# Patient Record
Sex: FEMALE | Race: BLACK OR AFRICAN AMERICAN | NOT HISPANIC OR LATINO | Employment: UNEMPLOYED | URBAN - METROPOLITAN AREA
[De-identification: names, ages, dates, MRNs, and addresses within clinical notes are randomized per-mention and may not be internally consistent; named-entity substitution may affect disease eponyms.]

---

## 2018-01-17 ENCOUNTER — ALLSCRIPTS OFFICE VISIT (OUTPATIENT)
Dept: OTHER | Facility: OTHER | Age: 48
End: 2018-01-17

## 2018-01-17 DIAGNOSIS — R25.1 TREMOR: ICD-10-CM

## 2018-01-17 DIAGNOSIS — M54.50 LOW BACK PAIN: ICD-10-CM

## 2018-01-18 ENCOUNTER — TRANSCRIBE ORDERS (OUTPATIENT)
Dept: ADMINISTRATIVE | Facility: HOSPITAL | Age: 48
End: 2018-01-18

## 2018-01-18 DIAGNOSIS — R25.1 DYSPHONIA OF ORGANIC TREMOR: Primary | ICD-10-CM

## 2018-01-18 DIAGNOSIS — M54.40 LOW BACK PAIN WITH SCIATICA, SCIATICA LATERALITY UNSPECIFIED, UNSPECIFIED BACK PAIN LATERALITY, UNSPECIFIED CHRONICITY: ICD-10-CM

## 2018-01-18 DIAGNOSIS — R49.0 DYSPHONIA OF ORGANIC TREMOR: Primary | ICD-10-CM

## 2018-01-23 VITALS
BODY MASS INDEX: 34.93 KG/M2 | HEIGHT: 70 IN | SYSTOLIC BLOOD PRESSURE: 136 MMHG | DIASTOLIC BLOOD PRESSURE: 98 MMHG | WEIGHT: 244 LBS | HEART RATE: 90 BPM | RESPIRATION RATE: 16 BRPM

## 2018-01-29 ENCOUNTER — HOSPITAL ENCOUNTER (OUTPATIENT)
Dept: MRI IMAGING | Facility: HOSPITAL | Age: 48
Discharge: HOME | End: 2018-01-29
Attending: PSYCHIATRY & NEUROLOGY
Payer: COMMERCIAL

## 2018-01-29 DIAGNOSIS — R49.0 DYSPHONIA OF ORGANIC TREMOR: ICD-10-CM

## 2018-01-29 DIAGNOSIS — R25.1 DYSPHONIA OF ORGANIC TREMOR: ICD-10-CM

## 2018-01-29 DIAGNOSIS — M54.40 LOW BACK PAIN WITH SCIATICA, SCIATICA LATERALITY UNSPECIFIED, UNSPECIFIED BACK PAIN LATERALITY, UNSPECIFIED CHRONICITY: ICD-10-CM

## 2018-01-29 PROCEDURE — 70551 MRI BRAIN STEM W/O DYE: CPT

## 2018-01-29 PROCEDURE — 72148 MRI LUMBAR SPINE W/O DYE: CPT

## 2018-01-30 ENCOUNTER — TELEPHONE (OUTPATIENT)
Dept: NEUROLOGY | Facility: CLINIC | Age: 48
End: 2018-01-30

## 2018-01-30 NOTE — TELEPHONE ENCOUNTER
Discussed with patient results and advised patient to follow up with her gynecologist regarding abnormal findings

## 2018-01-30 NOTE — TELEPHONE ENCOUNTER
Radiology Department called and would like you to view patients results because there is significant findings

## 2018-01-30 NOTE — TELEPHONE ENCOUNTER
Left message for the patient both at her home and cell phone number to call us back to discuss the MRI scan result

## 2018-01-30 NOTE — TELEPHONE ENCOUNTER
Discussed with patient MRI scan of the lumbar spine result advised the patient to follow up with her gynecologist regarding abnormal findings she will  the MRI scan report from the office

## 2018-04-19 RX ORDER — ZOLPIDEM TARTRATE 10 MG/1
1 TABLET ORAL
COMMUNITY

## 2018-04-19 RX ORDER — BUSPIRONE HYDROCHLORIDE 30 MG/1
1 TABLET ORAL 2 TIMES DAILY
COMMUNITY

## 2018-04-19 RX ORDER — DULOXETIN HYDROCHLORIDE 60 MG/1
1 CAPSULE, DELAYED RELEASE ORAL 2 TIMES DAILY
COMMUNITY

## 2018-04-19 RX ORDER — OMEPRAZOLE 20 MG/1
1 CAPSULE, DELAYED RELEASE ORAL DAILY
COMMUNITY
End: 2019-04-30 | Stop reason: ALTCHOICE

## 2018-04-19 RX ORDER — GABAPENTIN 300 MG/1
1 CAPSULE ORAL 3 TIMES DAILY
COMMUNITY
End: 2019-09-05 | Stop reason: ALTCHOICE

## 2018-04-19 RX ORDER — OMEGA-3 FATTY ACIDS CAP DELAYED RELEASE 1000 MG 1000 MG
CAPSULE DELAYED RELEASE ORAL
COMMUNITY

## 2018-04-19 RX ORDER — MULTIVITAMIN
CAPSULE ORAL
COMMUNITY

## 2018-04-19 RX ORDER — MONTELUKAST SODIUM 10 MG/1
1 TABLET ORAL
COMMUNITY

## 2018-04-19 RX ORDER — MIRTAZAPINE 30 MG/1
30 TABLET, FILM COATED ORAL 2 TIMES DAILY
COMMUNITY
End: 2019-09-05 | Stop reason: ALTCHOICE

## 2018-04-19 RX ORDER — IBUPROFEN 800 MG/1
1 TABLET ORAL DAILY PRN
COMMUNITY
End: 2021-07-27 | Stop reason: ALTCHOICE

## 2018-04-19 RX ORDER — FEXOFENADINE HCL 180 MG/1
1 TABLET ORAL DAILY PRN
COMMUNITY
End: 2019-09-05 | Stop reason: ALTCHOICE

## 2018-04-19 RX ORDER — PNV NO.95/FERROUS FUM/FOLIC AC 28MG-0.8MG
1 TABLET ORAL DAILY
COMMUNITY

## 2018-04-19 RX ORDER — ERGOCALCIFEROL 1.25 MG/1
CAPSULE ORAL 2 TIMES WEEKLY
COMMUNITY
Start: 2018-01-21

## 2018-04-19 RX ORDER — NAPROXEN 500 MG/1
TABLET ORAL 2 TIMES DAILY PRN
COMMUNITY
Start: 2017-11-01 | End: 2019-04-30 | Stop reason: ALTCHOICE

## 2018-04-24 ENCOUNTER — OFFICE VISIT (OUTPATIENT)
Dept: NEUROLOGY | Facility: CLINIC | Age: 48
End: 2018-04-24
Payer: COMMERCIAL

## 2018-04-24 VITALS
WEIGHT: 251 LBS | SYSTOLIC BLOOD PRESSURE: 132 MMHG | HEART RATE: 88 BPM | DIASTOLIC BLOOD PRESSURE: 103 MMHG | HEIGHT: 70 IN | BODY MASS INDEX: 35.93 KG/M2

## 2018-04-24 DIAGNOSIS — R25.1 TREMOR: Primary | ICD-10-CM

## 2018-04-24 PROCEDURE — 99213 OFFICE O/P EST LOW 20 MIN: CPT | Performed by: PSYCHIATRY & NEUROLOGY

## 2018-04-24 RX ORDER — KETOCONAZOLE 20 MG/ML
SHAMPOO TOPICAL AS NEEDED
COMMUNITY
Start: 2018-03-19

## 2018-04-24 RX ORDER — ALBUTEROL SULFATE 90 UG/1
1 AEROSOL, METERED RESPIRATORY (INHALATION) EVERY 6 HOURS
COMMUNITY
Start: 2018-03-26 | End: 2019-03-26

## 2018-04-24 RX ORDER — KETOCONAZOLE 20 MG/G
CREAM TOPICAL AS NEEDED
COMMUNITY
Start: 2018-03-19

## 2018-04-24 RX ORDER — PREDNISONE 10 MG/1
TABLET ORAL
COMMUNITY
Start: 2018-03-26 | End: 2018-07-25 | Stop reason: ALTCHOICE

## 2018-04-24 NOTE — PROGRESS NOTES
Tre Anderson is a 51 y o  female  Chief Complaint   Patient presents with    Tremors     hands, head and legs       Assessment:  1  Tremor        Plan:    Discussion:  Differential diagnosis of tremor discussed with the patient most likely secondary to anxiety, currently patient does not have any tremor, she does not have any features of Parkinson's disease and tells me that her tremor is mostly when she is more anxious or under stress, she was advised to follow up with her psychiatrist and family physician regarding her anxiety, MRI scan of the brain and lumbar spine results were reviewed with the patient, she has going to get us the results of her blood work that she recently had with her family physician, she was advised to follow up with her gynecologist regarding her fibroids and with her other physicians and her family physician regarding her blood pressure and other issues and see me back in 3-4 months  Subjective:    HPI   Patient is here in follow-up for tremor, since her last visit her tremor seems to be under control except when she is under stress or anxious, she denies freezing episodes, there is no resting or intentional tremor, no difficulty in swallowing, she has history of fibromyalgia and is in follow up with her psychiatrist and family physician, no falls  No other complaints      Vitals:    04/24/18 1504   BP: (!) 132/103   BP Location: Left arm   Patient Position: Sitting   Cuff Size: Large   Pulse: 88   Weight: 114 kg (251 lb)   Height: 5' 10" (1 778 m)       Current Medications    Current Outpatient Prescriptions:     albuterol (PROVENTIL HFA,VENTOLIN HFA) 90 mcg/act inhaler, Inhale 1 puff every 6 (six) hours, Disp: , Rfl:     busPIRone (BUSPAR) 30 MG tablet, Take 1 tablet by mouth daily, Disp: , Rfl:     DULoxetine (CYMBALTA) 60 mg delayed release capsule, Take 1 capsule by mouth 2 (two) times a day, Disp: , Rfl:     ergocalciferol (VITAMIN D2) 50,000 units, 2 (two) times a week, Disp: , Rfl:     Ferrous Sulfate (IRON) 325 (65 Fe) MG TABS, Take 1 tablet by mouth daily, Disp: , Rfl:     fexofenadine (ALLEGRA) 180 MG tablet, Take 1 tablet by mouth daily as needed, Disp: , Rfl:     ibuprofen (MOTRIN) 800 mg tablet, Take 1 tablet by mouth daily as needed, Disp: , Rfl:     ketoconazole (NIZORAL) 2 % cream, , Disp: , Rfl:     ketoconazole (NIZORAL) 2 % shampoo, , Disp: , Rfl:     LYRICA 150 MG capsule, 2 (two) times a day, Disp: , Rfl:     mirtazapine (REMERON) 15 mg tablet, Take 30 mg by mouth daily at bedtime  , Disp: , Rfl:     montelukast (SINGULAIR) 10 mg tablet, Take 1 tablet by mouth daily as needed, Disp: , Rfl:     Multiple Vitamin (MULTIVITAMIN) capsule, Take by mouth, Disp: , Rfl:     naproxen (NAPROSYN) 500 mg tablet, 2 (two) times a day as needed  , Disp: , Rfl:     Omega-3 Fatty Acids (FISH OIL) 645 MG CAPS, Take by mouth, Disp: , Rfl:     omeprazole (PriLOSEC) 20 mg delayed release capsule, Take 1 capsule by mouth daily, Disp: , Rfl:     predniSONE 10 mg tablet, , Disp: , Rfl:     zolpidem (AMBIEN) 10 mg tablet, Take 1 tablet by mouth daily at bedtime as needed, Disp: , Rfl:     gabapentin (NEURONTIN) 300 mg capsule, Take 1 capsule by mouth 3 (three) times a day, Disp: , Rfl:       Allergies  Shellfish allergy; Cinnamon; and Latex    Past Medical History  Past Medical History:   Diagnosis Date    Breast cancer ([facility] [address] ) 2014    Eardrum rupture, left     Fibromyalgia, primary     Seasonal allergies          Past Surgical History:  Past Surgical History:   Procedure Laterality Date    BREAST LUMPECTOMY  2015    MYOMECTOMY           Family History:  Family History   Problem Relation Age of Onset    Parkinsonism Mother     Rheum arthritis Mother     Osteoarthritis Mother     Heart attack Mother     Diabetes Mother     Depression Mother     Hypertension Father     Heart murmur Father     Depression Father     Diabetes Sister     Diabetes Brother     Tremor Maternal Grandmother        Social History:       I have reviewed the past medical history, surgical history, social and family history, current medications, allergies vitals, review of systems, and updated this information as appropriate today  Objective:    Physical Exam    Neurological Exam      GENERAL:  Cooperative in no acute distress  Well-developed and well-nourished    HEAD and NECK   Head is atraumatic normocephalic with no lesions or masses  Neck is supple with full range of motion    CARDIOVASCULAR  Carotid Arteries-no carotid bruits  NEUROLOGIC:  Mental Status-the patient is awake alert and oriented without aphasia or apraxia  Cranial Nerves: Visual fields are full to confrontation  Extraocular movements are full without nystagmus  Pupils are 2-1/2 mm and reactive  Face is symmetrical to light touch  Movements of facial expression move symmetrically  Hearing is normal to finger rub bilaterally  Soft palate lifts symmetrically  Shoulder shrug is symmetrical  Tongue is midline without atrophy  Motor: No drift is noted on arm extension  Strength is full in the upper and lower extremities with normal bulk and tone  Sensory: Intact to temperature and vibratory sensation in the upper and lower extremities bilaterally  Cortical function is intact  Coordination: Finger to nose testing is performed accurately  Romberg is negative  Gait reveals a normal base with symmetrical arm swing  ROS:  Review of Systems   Constitutional: Positive for appetite change and fatigue  HENT: Positive for hearing loss  Negative for drooling, facial swelling and trouble swallowing  Eyes: Negative for photophobia, pain and visual disturbance  Respiratory: Positive for chest tightness and shortness of breath  Cardiovascular: Negative for chest pain  Gastrointestinal: Negative for abdominal pain, constipation, nausea and vomiting  Endocrine: Negative for polydipsia     Genitourinary: Positive for frequency  Negative for difficulty urinating, enuresis and flank pain  Musculoskeletal: Positive for arthralgias, back pain, joint swelling and neck pain  Skin: Negative for rash and wound  Neurological: Positive for dizziness, tremors, weakness, numbness and headaches  Negative for seizures  Psychiatric/Behavioral: Positive for confusion, decreased concentration, dysphoric mood, sleep disturbance and suicidal ideas  Negative for behavioral problems

## 2018-07-25 ENCOUNTER — OFFICE VISIT (OUTPATIENT)
Dept: NEUROLOGY | Facility: CLINIC | Age: 48
End: 2018-07-25
Payer: COMMERCIAL

## 2018-07-25 VITALS
HEIGHT: 68 IN | HEART RATE: 80 BPM | WEIGHT: 248 LBS | SYSTOLIC BLOOD PRESSURE: 124 MMHG | BODY MASS INDEX: 37.59 KG/M2 | DIASTOLIC BLOOD PRESSURE: 100 MMHG

## 2018-07-25 DIAGNOSIS — R25.1 TREMOR: Primary | ICD-10-CM

## 2018-07-25 PROCEDURE — 99213 OFFICE O/P EST LOW 20 MIN: CPT | Performed by: PSYCHIATRY & NEUROLOGY

## 2018-07-25 RX ORDER — HYDROXYZINE 50 MG/1
50 TABLET, FILM COATED ORAL 2 TIMES DAILY
COMMUNITY
End: 2019-09-05 | Stop reason: SDUPTHER

## 2018-07-25 NOTE — PROGRESS NOTES
Benny Rasheed is a 51 y o  female  Chief Complaint   Patient presents with    Tremors       Assessment:  1  Tremor        Plan:    Discussion:  Patient currently does not have any tremor, overall she is doing good, she was advised to continue follow up with her other specialist, to go to the hospital if has any worsening symptoms and call me, follow up with her other physicians and see me back in 6 months  Subjective:    HPI   Patient is here in follow-up for tremor, since her last visit she is doing good, she does not have any tremor, she had a flare-up of her fibromyalgia and is on increased dose of Lyrica, she denies any other complaints      Vitals:    07/25/18 1339   BP: 124/100   BP Location: Left arm   Patient Position: Sitting   Cuff Size: Large   Pulse: 80   Weight: 112 kg (248 lb)   Height: 5' 8" (1 727 m)       Current Medications    Current Outpatient Prescriptions:     albuterol (PROVENTIL HFA,VENTOLIN HFA) 90 mcg/act inhaler, Inhale 1 puff every 6 (six) hours, Disp: , Rfl:     busPIRone (BUSPAR) 30 MG tablet, Take 1 tablet by mouth 2 (two) times a day  , Disp: , Rfl:     DULoxetine (CYMBALTA) 60 mg delayed release capsule, Take 1 capsule by mouth 2 (two) times a day, Disp: , Rfl:     ergocalciferol (VITAMIN D2) 50,000 units, 2 (two) times a week, Disp: , Rfl:     Ferrous Sulfate (IRON) 325 (65 Fe) MG TABS, Take 1 tablet by mouth daily, Disp: , Rfl:     fexofenadine (ALLEGRA) 180 MG tablet, Take 1 tablet by mouth daily as needed, Disp: , Rfl:     gabapentin (NEURONTIN) 300 mg capsule, Take 1 capsule by mouth 3 (three) times a day, Disp: , Rfl:     hydrOXYzine HCL (ATARAX) 50 mg tablet, Take 50 mg by mouth 2 (two) times a day, Disp: , Rfl:     ibuprofen (MOTRIN) 800 mg tablet, Take 1 tablet by mouth daily as needed, Disp: , Rfl:     ketoconazole (NIZORAL) 2 % cream, , Disp: , Rfl:     ketoconazole (NIZORAL) 2 % shampoo, , Disp: , Rfl:     LYRICA 300 MG capsule, 2 (two) times a day, Disp: , Rfl:     mirtazapine (REMERON) 30 mg tablet, Take 30 mg by mouth 2 (two) times a day  , Disp: , Rfl:     montelukast (SINGULAIR) 10 mg tablet, Take 1 tablet by mouth daily as needed, Disp: , Rfl:     Multiple Vitamin (MULTIVITAMIN) capsule, Take by mouth, Disp: , Rfl:     naproxen (NAPROSYN) 500 mg tablet, 2 (two) times a day as needed  , Disp: , Rfl:     Omega-3 Fatty Acids (FISH OIL) 645 MG CAPS, Take by mouth, Disp: , Rfl:     omeprazole (PriLOSEC) 20 mg delayed release capsule, Take 1 capsule by mouth daily, Disp: , Rfl:     zolpidem (AMBIEN) 10 mg tablet, Take 1 tablet by mouth daily at bedtime as needed, Disp: , Rfl:       Allergies  Shellfish allergy; Cinnamon; and Latex    Past Medical History  Past Medical History:   Diagnosis Date    Breast cancer ([facility] [address] ) 2014    Eardrum rupture, left     Fibromyalgia, primary     Seasonal allergies          Past Surgical History:  Past Surgical History:   Procedure Laterality Date    BREAST LUMPECTOMY  2015    MYOMECTOMY           Family History:  Family History   Problem Relation Age of Onset    Parkinsonism Mother     Rheum arthritis Mother     Osteoarthritis Mother     Heart attack Mother     Diabetes Mother     Depression Mother     Hypertension Father     Heart murmur Father     Depression Father     Diabetes Sister     Diabetes Brother     Tremor Maternal Grandmother        Social History:   reports that she has never smoked  She has never used smokeless tobacco  She reports that she does not drink alcohol or use drugs  I have reviewed the past medical history, surgical history, social and family history, current medications, allergies vitals, review of systems, and updated this information as appropriate today  Objective:    Physical Exam    Neurological Exam    GENERAL:  Cooperative in no acute distress  Well-developed and well-nourished    HEAD and NECK   Head is atraumatic normocephalic with no lesions or masses   Neck is supple with full range of motion    CARDIOVASCULAR  Carotid Arteries-no carotid bruits  NEUROLOGIC:  Mental Status-the patient is awake alert and oriented without aphasia or apraxia  Cranial Nerves: Visual fields are full to confrontation  Extraocular movements are full without nystagmus  Pupils are 2-1/2 mm and reactive  Face is symmetrical to light touch  Movements of facial expression move symmetrically  Hearing is normal to finger rub bilaterally  Soft palate lifts symmetrically  Shoulder shrug is symmetrical  Tongue is midline without atrophy  Motor: No drift is noted on arm extension  Strength is full in the upper and lower extremities with normal bulk and tone  No tremor on outstretched hands  Sensory: Intact to temperature and vibratory sensation in the upper and lower extremities bilaterally  Cortical function is intact  Coordination: Finger to nose testing is performed accurately  Gait reveals a normal base with symmetrical arm swing  ROS:  Review of Systems   Constitutional: Positive for fatigue  Negative for appetite change and fever  HENT: Positive for tinnitus  Negative for hearing loss, trouble swallowing and voice change  Eyes: Negative  Negative for photophobia and pain  Respiratory: Positive for shortness of breath  Cardiovascular: Negative  Negative for palpitations  Gastrointestinal: Positive for abdominal pain and diarrhea  Negative for nausea and vomiting  Endocrine: Negative  Negative for cold intolerance and heat intolerance  Genitourinary: Positive for enuresis  Negative for dysuria, frequency and urgency  Musculoskeletal: Positive for back pain, gait problem and myalgias  Negative for neck pain  Left shoulder pain   Skin: Negative  Negative for rash  Neurological: Positive for dizziness, tremors and numbness  Negative for seizures, syncope, facial asymmetry, speech difficulty, weakness, light-headedness and headaches     Hematological: Bruises/bleeds easily  Psychiatric/Behavioral: Positive for decreased concentration and sleep disturbance  Negative for confusion and hallucinations

## 2018-12-27 ENCOUNTER — TELEPHONE (OUTPATIENT)
Dept: NEUROLOGY | Facility: CLINIC | Age: 48
End: 2018-12-27

## 2018-12-27 NOTE — TELEPHONE ENCOUNTER
Called patient to reschedule her appointment with Dr Tata Floyd on February 4  Dr Tata Floyd will not be in office that day  No answer  Left voicemail  Per ac, we can use the hospital f/u slots

## 2019-01-07 NOTE — TELEPHONE ENCOUNTER
Called patient to reschedule her appointment with Dr Deann Fink that is scheduled on February 4  Dr Deann Fink will not be in office that day  No answer  Left voicemail  2nd attempt to reschedule

## 2019-01-25 NOTE — TELEPHONE ENCOUNTER
3rd attempt to reach patient to reschedule her appointment on February 4 with Dr Ramirez Garcia  No answer  Left message  Letter sent to out in the mail to call to reschedule appointment

## 2019-04-30 ENCOUNTER — OFFICE VISIT (OUTPATIENT)
Dept: NEUROLOGY | Facility: CLINIC | Age: 49
End: 2019-04-30
Payer: COMMERCIAL

## 2019-04-30 VITALS
DIASTOLIC BLOOD PRESSURE: 88 MMHG | HEART RATE: 80 BPM | WEIGHT: 244.8 LBS | HEIGHT: 68 IN | SYSTOLIC BLOOD PRESSURE: 118 MMHG | BODY MASS INDEX: 37.1 KG/M2

## 2019-04-30 DIAGNOSIS — R25.1 TREMOR: Primary | ICD-10-CM

## 2019-04-30 PROCEDURE — 99213 OFFICE O/P EST LOW 20 MIN: CPT | Performed by: PSYCHIATRY & NEUROLOGY

## 2019-04-30 RX ORDER — ALBUTEROL SULFATE 90 UG/1
2 AEROSOL, METERED RESPIRATORY (INHALATION) EVERY 4 HOURS PRN
COMMUNITY

## 2019-04-30 RX ORDER — BACLOFEN 10 MG/1
10 TABLET ORAL 3 TIMES DAILY PRN
COMMUNITY
Start: 2018-12-11 | End: 2021-07-27

## 2019-04-30 RX ORDER — TRIAMCINOLONE ACETONIDE 1 MG/G
CREAM TOPICAL 2 TIMES DAILY PRN
COMMUNITY
Start: 2019-02-21 | End: 2021-07-27 | Stop reason: ALTCHOICE

## 2019-04-30 RX ORDER — PANTOPRAZOLE SODIUM 40 MG/1
40 TABLET, DELAYED RELEASE ORAL AS NEEDED
COMMUNITY
Start: 2018-08-14 | End: 2021-07-27

## 2019-04-30 RX ORDER — FLUTICASONE PROPIONATE 50 MCG
2 SPRAY, SUSPENSION (ML) NASAL DAILY PRN
COMMUNITY
Start: 2019-04-18 | End: 2020-11-30

## 2019-08-27 ENCOUNTER — TRANSCRIBE ORDERS (OUTPATIENT)
Dept: NEUROLOGY | Facility: CLINIC | Age: 49
End: 2019-08-27

## 2019-08-27 DIAGNOSIS — R25.1 TREMOR: Primary | ICD-10-CM

## 2019-09-05 ENCOUNTER — OFFICE VISIT (OUTPATIENT)
Dept: NEUROLOGY | Facility: CLINIC | Age: 49
End: 2019-09-05
Payer: COMMERCIAL

## 2019-09-05 VITALS
SYSTOLIC BLOOD PRESSURE: 140 MMHG | BODY MASS INDEX: 37.28 KG/M2 | HEIGHT: 68 IN | DIASTOLIC BLOOD PRESSURE: 110 MMHG | WEIGHT: 246 LBS | HEART RATE: 72 BPM

## 2019-09-05 DIAGNOSIS — R25.1 TREMOR: ICD-10-CM

## 2019-09-05 PROCEDURE — 99213 OFFICE O/P EST LOW 20 MIN: CPT | Performed by: PSYCHIATRY & NEUROLOGY

## 2019-09-05 RX ORDER — MIRTAZAPINE 45 MG/1
45 TABLET, ORALLY DISINTEGRATING ORAL
COMMUNITY
Start: 2019-09-04

## 2019-09-05 RX ORDER — MELOXICAM 15 MG/1
15 TABLET ORAL DAILY
Refills: 0 | COMMUNITY
Start: 2019-07-13 | End: 2020-11-30 | Stop reason: ALTCHOICE

## 2019-09-05 NOTE — PROGRESS NOTES
Catrachita Ochoa is a 47 y o  female  Chief Complaint   Patient presents with    Tremors       Assessment:  1  Tremor          Discussion:  Differential diagnosis discussed with the patient, most likely benign essential tremor, currently do not see any tremor and I do not see any other features of Parkinson's disease except that she has some early morning stiffness which could be due to fibromyalgia, I explained to the patient that sometimes there can be an overlap and will need to continue monitoring it, she was advised home exercise program and stretching exercises, he is going for her fibroid surgery, I advised her to follow up with her family physician regarding her elevated blood pressure ASAP also was advised to go to the hospital if has any worsening symptoms and call me otherwise to see me back in 6 months and follow up with her other physicians  Subjective:    HPI   Patient is here in follow-up for tremor, she has intentional tremor especially when she is doing things including her hand sometimes her head, since her last visit she feels her tremor is much better, her anxiety medications was adjusted, she also has history of fibromyalgia and is on Lyrica, she is going for fibroid surgery in the next few days, she has some early morning stiffness due to her history of fibromyalgia, no freezing episodes, no difficulty in swallowing, no memory difficulty, no falls, no resting tremor, no hallucination, no other complaints      Vitals:    09/05/19 1312   BP: (!) 140/110   BP Location: Right arm   Patient Position: Sitting   Cuff Size: Large   Pulse: 72   Weight: 112 kg (246 lb)   Height: 5' 8" (1 727 m)       Current Medications    Current Outpatient Medications:     albuterol (VENTOLIN HFA) 90 mcg/act inhaler, Ventolin HFA 90 mcg/actuation aerosol inhaler, Disp: , Rfl:     baclofen 10 mg tablet, Take 10 mg by mouth Three times daily as needed, Disp: , Rfl:     busPIRone (BUSPAR) 30 MG tablet, Take 1 tablet by mouth 2 (two) times a day  , Disp: , Rfl:     DULoxetine (CYMBALTA) 60 mg delayed release capsule, Take 1 capsule by mouth 2 (two) times a day, Disp: , Rfl:     ergocalciferol (VITAMIN D2) 50,000 units, 2 (two) times a week, Disp: , Rfl:     Ferrous Sulfate (IRON) 325 (65 Fe) MG TABS, Take 1 tablet by mouth daily, Disp: , Rfl:     fluticasone (FLONASE) 50 mcg/act nasal spray, 2 sprays into each nostril daily, Disp: , Rfl:     HYDROXYZINE PAMOATE PO, Take 45 mg by mouth daily , Disp: , Rfl: 3    ibuprofen (MOTRIN) 800 mg tablet, Take 1 tablet by mouth daily as needed, Disp: , Rfl:     ketoconazole (NIZORAL) 2 % cream, , Disp: , Rfl:     ketoconazole (NIZORAL) 2 % shampoo, , Disp: , Rfl:     LYRICA 300 MG capsule, 2 (two) times a day, Disp: , Rfl:     meloxicam (MOBIC) 15 mg tablet, Take 15 mg by mouth daily, Disp: , Rfl: 0    mirtazapine (REMERON SOL-TAB) 45 mg dispersible tablet, Take 45 mg by mouth daily at bedtime , Disp: , Rfl:     montelukast (SINGULAIR) 10 mg tablet, Take 1 tablet by mouth daily as needed, Disp: , Rfl:     Multiple Vitamin (MULTIVITAMIN) capsule, Take by mouth, Disp: , Rfl:     Omega-3 Fatty Acids (FISH OIL) 1000 MG CPDR, Take by mouth , Disp: , Rfl:     triamcinolone (KENALOG) 0 1 % cream, Apply topically 2 (two) times a day, Disp: , Rfl:     zolpidem (AMBIEN) 10 mg tablet, Take 1 tablet by mouth daily at bedtime as needed, Disp: , Rfl:     pantoprazole (PROTONIX) 40 mg tablet, Take 40 mg by mouth daily, Disp: , Rfl:       Allergies  Shellfish allergy; Cinnamon; and Latex    Past Medical History  Past Medical History:   Diagnosis Date    [facility]    Eardrum rupture, left     Fibromyalgia, primary     [facility] unspecified laterality [facility]     surgery scheduled 06-03-19    Seasonal allergies          Past Surgical History:  Past Surgical History:   Procedure Laterality Date    BREAST LUMPECTOMY  2015    MYOMECTOMY           Family History:  Family History   Problem Relation Age of Onset   Erna Emmdonna Parkinsonism Mother     Rheum arthritis Mother     Osteoarthritis Mother     Heart attack Mother     Diabetes Mother     Depression Mother     Hypertension Father     Heart murmur Father     Depression Father     Diabetes Sister     Diabetes Brother     Tremor Maternal Grandmother        Social History:   reports that she has never smoked  She has never used smokeless tobacco  She reports that she does not drink alcohol or use drugs  I have reviewed the past medical history, surgical history, social and family history, current medications, allergies vitals, review of systems, and updated this information as appropriate today  Objective:    Physical Exam    Neurological Exam    GENERAL:  Cooperative in no acute distress  Well-developed and well-nourished    HEAD and NECK   Head is atraumatic normocephalic with no lesions or masses  Neck is supple with full range of motion    CARDIOVASCULAR  Carotid Arteries-no carotid bruits  NEUROLOGIC:  Mental Status-the patient is awake alert and oriented without aphasia or apraxia  Cranial Nerves: Visual fields are full to confrontation  Extraocular movements are full without nystagmus  Pupils are 2-1/2 mm and reactive  Face is symmetrical to light touch  Movements of facial expression move symmetrically  Hearing is normal to finger rub bilaterally  Soft palate lifts symmetrically  Shoulder shrug is symmetrical  Tongue is midline without atrophy  Motor: No drift is noted on arm extension  Strength is full in the upper and lower extremities with normal bulk and tone  No tremor noted  Sensory: Intact to temperature and vibratory sensation in the upper and lower extremities bilaterally  Cortical function is intact  Coordination: Finger to nose testing is performed accurately  Romberg is negative  Gait reveals a normal base with symmetrical arm swing     Reflexes:   1+ and symmetrical          ROS:  Review of Systems   Constitutional: Negative  Negative for appetite change and fever  HENT: Negative  Negative for hearing loss, tinnitus, trouble swallowing and voice change  Eyes: Negative  Negative for photophobia and pain  Respiratory: Negative  Negative for shortness of breath  Cardiovascular: Negative  Negative for palpitations  Gastrointestinal: Positive for abdominal pain (Ovarian Cyst)  Negative for nausea and vomiting  Endocrine: Negative  Negative for cold intolerance and heat intolerance  Genitourinary: Negative  Negative for dysuria, frequency and urgency  Musculoskeletal: Positive for back pain, myalgias and neck pain (Left sided)  Left shoulder pain   Skin: Negative  Negative for rash  Neurological: Positive for tremors  Negative for seizures, syncope, facial asymmetry, speech difficulty, weakness, light-headedness, numbness and headaches  Hematological: Negative  Does not bruise/bleed easily  Psychiatric/Behavioral: Positive for sleep disturbance  Negative for confusion and hallucinations  The patient is nervous/anxious

## 2019-09-09 ENCOUNTER — TELEPHONE (OUTPATIENT)
Dept: NEUROLOGY | Facility: CLINIC | Age: 49
End: 2019-09-09

## 2020-03-06 ENCOUNTER — OFFICE VISIT (OUTPATIENT)
Dept: NEUROLOGY | Facility: CLINIC | Age: 50
End: 2020-03-06
Payer: COMMERCIAL

## 2020-03-06 VITALS
HEART RATE: 86 BPM | SYSTOLIC BLOOD PRESSURE: 134 MMHG | RESPIRATION RATE: 18 BRPM | HEIGHT: 68 IN | BODY MASS INDEX: 37.59 KG/M2 | DIASTOLIC BLOOD PRESSURE: 82 MMHG | WEIGHT: 248 LBS

## 2020-03-06 DIAGNOSIS — R25.1 TREMOR: Primary | ICD-10-CM

## 2020-03-06 PROCEDURE — 99213 OFFICE O/P EST LOW 20 MIN: CPT | Performed by: PSYCHIATRY & NEUROLOGY

## 2020-03-06 RX ORDER — LIDOCAINE 50 MG/G
PATCH TOPICAL
COMMUNITY
Start: 2020-01-20 | End: 2020-11-30 | Stop reason: ALTCHOICE

## 2020-03-06 RX ORDER — FLUOXETINE HYDROCHLORIDE 20 MG/1
40 CAPSULE ORAL DAILY
COMMUNITY
Start: 2020-02-14 | End: 2020-11-30 | Stop reason: ALTCHOICE

## 2020-03-06 RX ORDER — DEXTROMETHORPHAN HYDROBROMIDE AND PROMETHAZINE HYDROCHLORIDE 15; 6.25 MG/5ML; MG/5ML
SYRUP ORAL
COMMUNITY
Start: 2020-02-04 | End: 2020-11-30 | Stop reason: ALTCHOICE

## 2020-03-06 RX ORDER — CYCLOBENZAPRINE HCL 10 MG
10 TABLET ORAL 3 TIMES DAILY
COMMUNITY
End: 2020-11-30 | Stop reason: ALTCHOICE

## 2020-03-06 RX ORDER — CEPHALEXIN 500 MG/1
CAPSULE ORAL
COMMUNITY
Start: 2020-02-29 | End: 2020-11-30 | Stop reason: ALTCHOICE

## 2020-03-06 RX ORDER — METHYLPREDNISOLONE 4 MG/1
TABLET ORAL
COMMUNITY
Start: 2020-01-31 | End: 2020-11-30 | Stop reason: ALTCHOICE

## 2020-03-06 NOTE — PROGRESS NOTES
Marcos Sutton is a 45 y o  female  Chief Complaint   Patient presents with    Tremors       Assessment:  1  Tremor        Discussion:  Patient most likely has benign essential tremor, or medication related, I do not see any tremor at this time may be except for a slight head tremor, she does not have any other features of Parkinson's disease, I explained to the patient as sometimes there can be an overlap and we need to monitor it, she was advised regular exercise program, the tremor is not that bothersome for the patient to be on any medication, she was advised to keep her blood pressure cholesterol and sugar under control, to go to the hospital if has any worsening symptoms and call me otherwise to see me back in 4 months and follow up with other physicians  Subjective:    HPI   Patient is here in follow-up for tremors, since her last visit she has had some issues with her hysterectomy and feels at times that her tremor is worse if this is especially when she is doing things, she has not noticed much of a hand tremor except for head tremor and is more so when she is more anxious, she also has history of fibromyalgia, and is on medications for that, she had hysterectomy and according to the patient had some complications and is in follow up with the gynecologist, she denies any freezing episodes, no difficulty in swallowing, no hallucinations, no memory difficulty, no resting tremor, no other complaints      Vitals:    03/06/20 1232   BP: 134/82   BP Location: Left arm   Patient Position: Sitting   Cuff Size: Large   Pulse: 86   Resp: 18   Weight: 112 kg (248 lb)   Height: 5' 8" (1 727 m)       Current Medications    Current Outpatient Medications:     albuterol (VENTOLIN HFA) 90 mcg/act inhaler, Ventolin HFA 90 mcg/actuation aerosol inhaler, Disp: , Rfl:     baclofen 10 mg tablet, Take 10 mg by mouth Three times daily as needed, Disp: , Rfl:     busPIRone (BUSPAR) 30 MG tablet, Take 1 tablet by mouth 2 (two) times a day  , Disp: , Rfl:     DULoxetine (CYMBALTA) 60 mg delayed release capsule, Take 1 capsule by mouth 2 (two) times a day, Disp: , Rfl:     ergocalciferol (VITAMIN D2) 50,000 units, 2 (two) times a week, Disp: , Rfl:     Ferrous Sulfate (IRON) 325 (65 Fe) MG TABS, Take 1 tablet by mouth daily, Disp: , Rfl:     FLUoxetine (PROzac) 20 mg capsule, Daily, Disp: , Rfl:     fluticasone (FLONASE) 50 mcg/act nasal spray, 2 sprays into each nostril daily, Disp: , Rfl:     HYDROXYZINE PAMOATE PO, Take 45 mg by mouth daily , Disp: , Rfl: 3    ibuprofen (MOTRIN) 800 mg tablet, Take 1 tablet by mouth daily as needed, Disp: , Rfl:     ketoconazole (NIZORAL) 2 % cream, as needed , Disp: , Rfl:     ketoconazole (NIZORAL) 2 % shampoo, as needed , Disp: , Rfl:     LYRICA 300 MG capsule, 2 (two) times a day, Disp: , Rfl:     mirtazapine (REMERON SOL-TAB) 45 mg dispersible tablet, Take 45 mg by mouth daily at bedtime , Disp: , Rfl:     montelukast (SINGULAIR) 10 mg tablet, Take 1 tablet by mouth daily at bedtime , Disp: , Rfl:     Multiple Vitamin (MULTIVITAMIN) capsule, Take by mouth, Disp: , Rfl:     Omega-3 Fatty Acids (FISH OIL) 1000 MG CPDR, Take by mouth , Disp: , Rfl:     pantoprazole (PROTONIX) 40 mg tablet, Take 40 mg by mouth as needed , Disp: , Rfl:     triamcinolone (KENALOG) 0 1 % cream, Apply topically 2 (two) times a day as needed , Disp: , Rfl:     zolpidem (AMBIEN) 10 mg tablet, Take 1 tablet by mouth daily at bedtime as needed, Disp: , Rfl:     cephalexin (KEFLEX) 500 mg capsule, TAKE 1 CAPSULE BY MOUTH THREE TIMES DAILY FOR 7 DAYS, Disp: , Rfl:     cyclobenzaprine (FLEXERIL) 10 mg tablet, 10 mg 3 (three) times a day, Disp: , Rfl:     lidocaine (LIDODERM) 5 %, , Disp: , Rfl:     meloxicam (MOBIC) 15 mg tablet, Take 15 mg by mouth daily, Disp: , Rfl: 0    methylPREDNISolone 4 MG tablet therapy pack, , Disp: , Rfl:     promethazine-dextromethorphan (PHENERGAN-DM) 6 25-15 mg/5 mL oral syrup, TAKE 5 ML BY MOUTH NIGHTLY AS NEEDED FOR COUGH, Disp: , Rfl:       Allergies  Aspirin; Shellfish allergy; Cinnamon; and Latex    Past Medical History  Past Medical History:   Diagnosis Date    Breast cancer ([facility] [address] ) 2014    Eardrum rupture, left     Fibromyalgia, primary     Ovarian CA, unspecified laterality ([facility] )     surgery scheduled 06-03-19    Seasonal allergies          Past Surgical History:  Past Surgical History:   Procedure Laterality Date    BREAST LUMPECTOMY  2015    MYOMECTOMY           Family History:  Family History   Problem Relation Age of Onset   [facility] Parkinsonism Mother     Rheum arthritis Mother     Osteoarthritis Mother     Heart attack Mother     Diabetes Mother     Depression Mother     Hypertension Father     Heart murmur Father     Depression Father     Diabetes Sister     Diabetes Brother     Tremor Maternal Grandmother        Social History:   reports that she has never smoked  She has never used smokeless tobacco  She reports that she does not drink alcohol or use drugs  I have reviewed the past medical history, surgical history, social and family history, current medications, allergies vitals, review of systems, and updated this information as appropriate today  Objective:    Physical Exam    Neurological Exam    GENERAL:  Cooperative in no acute distress  Well-developed and well-nourished    HEAD and NECK   Head is atraumatic normocephalic with no lesions or masses  Neck is supple with full range of motion    CARDIOVASCULAR  Carotid Arteries-no carotid bruits  NEUROLOGIC:  Mental Status-the patient is awake alert and oriented without aphasia or apraxia  Cranial Nerves: Visual fields are full to confrontation  Extraocular movements are full without nystagmus  Pupils are 2-1/2 mm and reactive  Face is symmetrical to light touch  Movements of facial expression move symmetrically  Hearing is normal to finger rub bilaterally  Soft palate lifts symmetrically  Shoulder shrug is symmetrical  Tongue is midline without atrophy  Motor: No drift is noted on arm extension  Strength is full in the upper and lower extremities with normal bulk and tone  No tremor of outstretched hands  Sensory: Intact to temperature and vibratory sensation in the upper and lower extremities bilaterally  Cortical function is intact  Coordination: Finger to nose testing is performed accurately  Romberg is negative  Gait reveals a normal base with symmetrical arm swing  Reflexes:  1+ and symmetrical          ROS:  Review of Systems   Constitutional: Positive for fatigue  HENT: Negative  Eyes: Negative  Respiratory: Negative  Cardiovascular: Negative  Gastrointestinal: Positive for nausea  Endocrine: Negative  Genitourinary: Negative  Musculoskeletal: Positive for back pain and neck pain  Right shoulder pain still from fibromyalgia   Muscle spasms that last from 5-10 minutes to sometimes over an hour   Allergic/Immunologic: Negative  Neurological: Positive for dizziness, tremors (A lot fo shaking after hysterectomy up to 2 months after) and headaches  Hematological: Negative  Psychiatric/Behavioral: Positive for sleep disturbance (Insomnia)

## 2020-07-28 ENCOUNTER — OFFICE VISIT (OUTPATIENT)
Dept: NEUROLOGY | Facility: CLINIC | Age: 50
End: 2020-07-28
Payer: COMMERCIAL

## 2020-07-28 VITALS
TEMPERATURE: 98.5 F | BODY MASS INDEX: 37.54 KG/M2 | SYSTOLIC BLOOD PRESSURE: 140 MMHG | WEIGHT: 262.2 LBS | HEART RATE: 89 BPM | DIASTOLIC BLOOD PRESSURE: 92 MMHG | HEIGHT: 70 IN

## 2020-07-28 DIAGNOSIS — R25.1 TREMOR: Primary | ICD-10-CM

## 2020-07-28 PROCEDURE — 99214 OFFICE O/P EST MOD 30 MIN: CPT | Performed by: PSYCHIATRY & NEUROLOGY

## 2020-07-28 RX ORDER — ESTRADIOL 0.04 MG/D
1 FILM, EXTENDED RELEASE TRANSDERMAL 2 TIMES WEEKLY
COMMUNITY

## 2020-07-28 RX ORDER — TOLTERODINE TARTRATE 1 MG/1
1 TABLET, EXTENDED RELEASE ORAL 2 TIMES DAILY
COMMUNITY
End: 2021-07-27 | Stop reason: ALTCHOICE

## 2020-07-28 NOTE — PROGRESS NOTES
Julianna Bernal is a 45 y o  female  Assessment:  1  Tremor          Discussion:  Differential diagnosis discussed with the patient possible benign essential tremor especially given family history of tremor in her mother, other contributing factors could be her medications including Lyrica, I discussed with her about medication options like primidone, given that she is already on quite a bit of medication there could be side effects, she is going to discuss with her family physician and psychiatrist and see if they can decrease some of her medications include Lyrica and see if that makes a difference, also was advised to keep her blood pressure cholesterol and sugar under control, I did not see any other features for Parkinson's disease at this time, we could have her see 1 of our movement disorder specialist in the future, she was advised to take fall and safety precautions, she was also advised to avoid anxiety, go to the hospital if has any worsening symptoms and call me otherwise to see me back in 3-4 months and follow up with other physicians  Subjective:    HPI   Patient is here in follow-up for tremor, since her last visit she feels her tremor is slightly worse as she is under stress is mostly intentional in nature, she denies any resting tremor, no early morning stiffness, she denies any hallucinations, no sleep difficulty, she had a fall on ice and broke her right elbow and left ankle and is recovering from that and is using a cane to ambulate, she also has history of fibromyalgia and is on medications for that, denies any hallucinations, no freezing episodes, no difficulty in swallowing, no memory difficulty, no resting tremor, her mother has history of tremor, no other complaints      Vitals:    07/28/20 1258   BP: 140/92   BP Location: Right arm   Patient Position: Sitting   Cuff Size: Standard   Pulse: 89   Temp: 98 5 °F (36 9 °C)   Weight: 119 kg (262 lb 3 2 oz)   Height: 5' 10" (1 778 m) Current Medications    Current Outpatient Medications:     albuterol (VENTOLIN HFA) 90 mcg/act inhaler, Ventolin HFA 90 mcg/actuation aerosol inhaler, Disp: , Rfl:     baclofen 10 mg tablet, Take 10 mg by mouth Three times daily as needed, Disp: , Rfl:     busPIRone (BUSPAR) 30 MG tablet, Take 1 tablet by mouth 2 (two) times a day  , Disp: , Rfl:     cyclobenzaprine (FLEXERIL) 10 mg tablet, 10 mg 3 (three) times a day, Disp: , Rfl:     DULoxetine (CYMBALTA) 60 mg delayed release capsule, Take 1 capsule by mouth 2 (two) times a day, Disp: , Rfl:     ergocalciferol (VITAMIN D2) 50,000 units, 2 (two) times a week, Disp: , Rfl:     Ferrous Sulfate (IRON) 325 (65 Fe) MG TABS, Take 1 tablet by mouth daily, Disp: , Rfl:     FLUoxetine (PROzac) 20 mg capsule, Take 40 mg by mouth Daily , Disp: , Rfl:     fluticasone (FLONASE) 50 mcg/act nasal spray, 2 sprays into each nostril daily as needed , Disp: , Rfl:     HYDROXYZINE PAMOATE PO, Take 45 mg by mouth daily , Disp: , Rfl: 3    ibuprofen (MOTRIN) 800 mg tablet, Take 1 tablet by mouth daily as needed, Disp: , Rfl:     ketoconazole (NIZORAL) 2 % cream, as needed , Disp: , Rfl:     ketoconazole (NIZORAL) 2 % shampoo, as needed , Disp: , Rfl:     LYRICA 300 MG capsule, 2 (two) times a day, Disp: , Rfl:     mirtazapine (REMERON SOL-TAB) 45 mg dispersible tablet, Take 45 mg by mouth daily at bedtime , Disp: , Rfl:     montelukast (SINGULAIR) 10 mg tablet, Take 1 tablet by mouth daily at bedtime , Disp: , Rfl:     Multiple Vitamin (MULTIVITAMIN) capsule, Take by mouth, Disp: , Rfl:     Omega-3 Fatty Acids (FISH OIL) 1000 MG CPDR, Take by mouth , Disp: , Rfl:     pantoprazole (PROTONIX) 40 mg tablet, Take 40 mg by mouth as needed , Disp: , Rfl:     triamcinolone (KENALOG) 0 1 % cream, Apply topically 2 (two) times a day as needed , Disp: , Rfl:     zolpidem (AMBIEN) 10 mg tablet, Take 1 tablet by mouth daily at bedtime as needed, Disp: , Rfl:    cephalexin (KEFLEX) 500 mg capsule, TAKE 1 CAPSULE BY MOUTH THREE TIMES DAILY FOR 7 DAYS, Disp: , Rfl:     lidocaine (LIDODERM) 5 %, , Disp: , Rfl:     meloxicam (MOBIC) 15 mg tablet, Take 15 mg by mouth daily, Disp: , Rfl: 0    methylPREDNISolone 4 MG tablet therapy pack, , Disp: , Rfl:     promethazine-dextromethorphan (PHENERGAN-DM) 6 25-15 mg/5 mL oral syrup, TAKE 5 ML BY MOUTH NIGHTLY AS NEEDED FOR COUGH, Disp: , Rfl:       Allergies  Aspirin; Shellfish allergy; Cinnamon; and Latex    Past Medical History  Past Medical History:   Diagnosis Date    Breast cancer ([facility] [address] ) 2014    Eardrum rupture, left     Fibromyalgia, primary     Ovarian CA, unspecified laterality ([facility] [address] )     surgery scheduled 06-03-19    Seasonal allergies          Past Surgical History:  Past Surgical History:   Procedure Laterality Date    BREAST LUMPECTOMY  2015    HYSTERECTOMY Bilateral 09/16/2019    MYOMECTOMY           Family History:  Family History   Problem Relation Age of Onset   [facility] Parkinsonism Mother     Rheum arthritis Mother     Osteoarthritis Mother     Heart attack Mother     Diabetes Mother     Depression Mother     Hypertension Father     Heart murmur Father     Depression Father     Diabetes Sister     Diabetes Brother     Tremor Maternal Grandmother        Social History:   reports that she has never smoked  She has never used smokeless tobacco  She reports that she does not drink alcohol or use drugs  I have reviewed the past medical history, surgical history, social and family history, current medications, allergies vitals, review of systems, and updated this information as appropriate today  Objective:    Physical Exam    Neurological Exam    GENERAL:  Cooperative in no acute distress  Well-developed and well-nourished    HEAD and NECK   Head is atraumatic normocephalic with no lesions or masses   Neck is supple with full range of motion      NEUROLOGIC:  Mental Status-the patient is awake alert and oriented without aphasia or apraxia  Cranial Nerves: Visual fields are full to confrontation  Extraocular movements are full without nystagmus  Pupils are 2-1/2 mm and reactive  Face is symmetrical to light touch  Movements of facial expression move symmetrically  Hearing is normal to finger rub bilaterally  Soft palate lifts symmetrically  Shoulder shrug is symmetrical  Tongue is midline without atrophy  Patient did remove the mask for the exam  Motor: No drift is noted on arm extension  Strength is full in the upper and lower extremities with normal bulk and tone  No resting tremor noted, very minimal tremor of the outstretched hand  Sensory: Intact to temperature and vibratory sensation in the upper and lower extremities bilaterally  Cortical function is intact  Coordination: Finger to nose testing is performed accurately slightly limited on the right arm secondary to elbow pain  Ambulates with a cane  Reflexes:  1+ and symmetrical          ROS:  Review of Systems   Constitutional: Negative  Negative for appetite change and fever  HENT: Negative  Negative for hearing loss, tinnitus, trouble swallowing and voice change  Eyes: Negative  Negative for photophobia and pain  Respiratory: Negative  Negative for shortness of breath  Cardiovascular: Negative  Negative for palpitations  Gastrointestinal: Negative  Negative for nausea and vomiting  Endocrine: Negative  Negative for cold intolerance  Genitourinary: Negative  Negative for dysuria, frequency and urgency  Musculoskeletal: Positive for arthralgias and gait problem  Negative for myalgias and neck pain  Skin: Negative  Negative for rash  Neurological: Positive for tremors  Negative for dizziness, seizures, syncope, facial asymmetry, speech difficulty, weakness, light-headedness, numbness and headaches  Hematological: Negative  Does not bruise/bleed easily  Psychiatric/Behavioral: Negative    Negative for confusion, hallucinations and sleep disturbance

## 2020-11-30 ENCOUNTER — TELEMEDICINE (OUTPATIENT)
Dept: NEUROLOGY | Facility: CLINIC | Age: 50
End: 2020-11-30
Payer: COMMERCIAL

## 2020-11-30 ENCOUNTER — TELEPHONE (OUTPATIENT)
Dept: NEUROLOGY | Facility: CLINIC | Age: 50
End: 2020-11-30

## 2020-11-30 VITALS — HEIGHT: 70 IN | BODY MASS INDEX: 38.8 KG/M2 | WEIGHT: 271 LBS

## 2020-11-30 DIAGNOSIS — R25.1 TREMOR: Primary | ICD-10-CM

## 2020-11-30 PROCEDURE — 99213 OFFICE O/P EST LOW 20 MIN: CPT | Performed by: PSYCHIATRY & NEUROLOGY

## 2020-11-30 RX ORDER — RISPERIDONE 0.5 MG/1
0.5 TABLET, FILM COATED ORAL
COMMUNITY

## 2020-11-30 RX ORDER — HYDROXYZINE PAMOATE 50 MG/1
CAPSULE ORAL
COMMUNITY
Start: 2020-11-12

## 2021-06-24 ENCOUNTER — TELEPHONE (OUTPATIENT)
Dept: NEUROLOGY | Facility: CLINIC | Age: 51
End: 2021-06-24

## 2021-06-24 NOTE — TELEPHONE ENCOUNTER
Received request for records from [employer]        Faxed to [facility] 6/24/2021

## 2021-07-21 ENCOUNTER — TELEPHONE (OUTPATIENT)
Dept: NEUROLOGY | Facility: CLINIC | Age: 51
End: 2021-07-21

## 2021-07-21 NOTE — TELEPHONE ENCOUNTER
Received records request from Jarvis Sanz asking for patients records to be sent to    [address] [place] [address] [address]      Faxed to [facility] 07/15/2021

## 2021-07-27 ENCOUNTER — OFFICE VISIT (OUTPATIENT)
Dept: NEUROLOGY | Facility: CLINIC | Age: 51
End: 2021-07-27
Payer: COMMERCIAL

## 2021-07-27 VITALS
BODY MASS INDEX: 41.07 KG/M2 | DIASTOLIC BLOOD PRESSURE: 88 MMHG | HEIGHT: 68 IN | SYSTOLIC BLOOD PRESSURE: 142 MMHG | WEIGHT: 271 LBS | HEART RATE: 80 BPM | RESPIRATION RATE: 16 BRPM

## 2021-07-27 DIAGNOSIS — R25.1 TREMOR, UNSPECIFIED: Primary | ICD-10-CM

## 2021-07-27 DIAGNOSIS — R41.89 BRAIN FOG: ICD-10-CM

## 2021-07-27 DIAGNOSIS — Z86.16 HISTORY OF COVID-19: ICD-10-CM

## 2021-07-27 PROCEDURE — 99214 OFFICE O/P EST MOD 30 MIN: CPT | Performed by: PSYCHIATRY & NEUROLOGY

## 2021-07-27 RX ORDER — SERTRALINE HYDROCHLORIDE 100 MG/1
100 TABLET, FILM COATED ORAL 2 TIMES DAILY
COMMUNITY

## 2021-07-27 RX ORDER — SOLIFENACIN SUCCINATE 5 MG/1
5 TABLET, FILM COATED ORAL DAILY
COMMUNITY

## 2021-07-27 RX ORDER — TRAMADOL HYDROCHLORIDE 50 MG/1
50 TABLET ORAL EVERY 8 HOURS PRN
COMMUNITY

## 2021-07-27 NOTE — PROGRESS NOTES
Tre Anderson is a 45 y o  female  Chief Complaint   Patient presents with    Neurologic Problem       Assessment:  1  Tremor, unspecified    2  History of COVID-19    3  Brain fog        Plan:  MRI of the brain    EEG  follow-up in 4 months    Discussion:   differential diagnosis discussed with the patient most likely brain for his from history of COVID, would recommend an MRI scan of the brain and an EEG, patient to call me after the test to discuss the results, she was advised to continue with mentally stimulating exercises, her tremor is much better, she was advised to remain physically and mentally active to keep her blood pressure cholesterol and sugar under control to go to the hospital if has any worsening symptoms and call me otherwise to see me back in 4 months and follow up with her other physicians  Subjective:    HPI    patient is here in follow-up for her  Tremor, since her last visit she had COVID and since then she did describes brain fog and having difficulty in attention and concentration, she had an episode in the stool wherein she was standing for quite sometime   Without doing anything, she denies having any seizures no syncopal episodes no hallucinations her tremor is under control is mostly intentional but it is not bothersome she has history of fibromyalgia and also is in follow up with the psychiatrist, she denies any difficulty in swallowing no bowel and bladder incontinence no headaches , she feels that she has to make an attempt to remembering things,no other complaints      Vitals:    07/27/21 1348   BP: 142/88   BP Location: Right arm   Patient Position: Sitting   Cuff Size: Standard   Pulse: 80   Resp: 16   Weight: 123 kg (271 lb)   Height: 5' 8" (1 727 m)       Current Medications    Current Outpatient Medications:     albuterol (VENTOLIN HFA) 90 mcg/act inhaler, Inhale 2 puffs every 4 (four) hours as needed , Disp: , Rfl:     baclofen 10 mg tablet, Take 10 mg by mouth Three times daily as needed, Disp: , Rfl:     busPIRone (BUSPAR) 30 MG tablet, Take 1 tablet by mouth 2 (two) times a day  , Disp: , Rfl:     COLLAGEN PO, Take 24,000 mg by mouth daily, Disp: , Rfl:     diclofenac sodium (VOLTAREN) 1 %, 3 (three) times a day, Disp: , Rfl:     DULoxetine (CYMBALTA) 60 mg delayed release capsule, Take 1 capsule by mouth 2 (two) times a day, Disp: , Rfl:     ergocalciferol (VITAMIN D2) 50,000 units, 2 (two) times a week, Disp: , Rfl:     estradiol (VIVELLE-DOT) 0 0375 MG/24HR, Place 1 patch on the skin 2 (two) times a week, Disp: , Rfl:     Ferrous Sulfate (IRON) 325 (65 Fe) MG TABS, Take 1 tablet by mouth daily, Disp: , Rfl:     hydrOXYzine pamoate (VISTARIL) 50 mg capsule, daily at bedtime, Disp: , Rfl:     ketoconazole (NIZORAL) 2 % cream, as needed , Disp: , Rfl:     ketoconazole (NIZORAL) 2 % shampoo, as needed , Disp: , Rfl:     LYRICA 300 MG capsule, 2 (two) times a day, Disp: , Rfl:     mirtazapine (REMERON SOL-TAB) 45 mg dispersible tablet, Take 45 mg by mouth daily at bedtime , Disp: , Rfl:     montelukast (SINGULAIR) 10 mg tablet, Take 1 tablet by mouth daily at bedtime , Disp: , Rfl:     Multiple Vitamin (MULTIVITAMIN) capsule, Take by mouth, Disp: , Rfl:     Omega-3 Fatty Acids (FISH OIL) 1000 MG CPDR, Take by mouth , Disp: , Rfl:     pantoprazole (PROTONIX) 40 mg tablet, Take 40 mg by mouth as needed , Disp: , Rfl:     risperiDONE (RisperDAL) 0 5 mg tablet, Take 0 5 mg by mouth daily at bedtime, Disp: , Rfl:     sertraline (Zoloft) 100 mg tablet, Take 100 mg by mouth 2 (two) times a day, Disp: , Rfl:     solifenacin (VESICARE) 5 mg tablet, Take 5 mg by mouth daily, Disp: , Rfl:     traMADol (ULTRAM) 50 mg tablet, Take 50 mg by mouth every 8 (eight) hours as needed for moderate pain, Disp: , Rfl:     TURMERIC PO, Take 1,500 mg by mouth 3 (three) times a day, Disp: , Rfl:     zolpidem (AMBIEN) 10 mg tablet, Take 1 tablet by mouth daily at bedtime as needed, Disp: , Rfl:     fluticasone (FLONASE) 50 mcg/act nasal spray, 2 sprays into each nostril daily as needed , Disp: , Rfl:       Allergies  Aspirin, Shellfish allergy - food allergy, Chlorhexidine, Cinnamon - food allergy, and Latex    Past Medical History  Past Medical History:   Diagnosis Date    Breast cancer ([facility] ) 2014    Eardrum rupture, left     Fibromyalgia, primary     Ovarian CA, unspecified laterality ([facility] )     surgery scheduled 06-03-19    Seasonal allergies          Past Surgical History:  Past Surgical History:   Procedure Laterality Date    BREAST LUMPECTOMY  2015    HYSTERECTOMY Bilateral 09/16/2019    MYOMECTOMY      UMBILICAL HERNIA REPAIR  09/29/2020         Family History:  Family History   Problem Relation Age of Onset   [facility] Parkinsonism Mother     Rheum arthritis Mother     Osteoarthritis Mother     Heart attack Mother     Diabetes Mother     Depression Mother     Hypertension Father     Heart murmur Father     Depression Father     Diabetes Sister     Diabetes Brother     Tremor Maternal Grandmother        Social History:   reports that she has never smoked  She has never used smokeless tobacco  She reports that she does not drink alcohol and does not use drugs  I have reviewed the past medical history, surgical history, social and family history, current medications, allergies vitals, review of systems, and updated this information as appropriate today  Objective:    Physical Exam    Neurological Exam    GENERAL:  Cooperative in no acute distress  Well-developed and well-nourished    HEAD and NECK   Head is atraumatic normocephalic with no lesions or masses  Neck is supple with full range of motion    CARDIOVASCULAR  Carotid Arteries-no carotid bruits  NEUROLOGIC:  Mental Status-the patient is awake alert and oriented without aphasia or apraxia  Cranial Nerves: Visual fields are full to confrontation  Extraocular movements are full without nystagmus   Pupils are 2-1/2 mm and reactive  Face is symmetrical to light touch  Movements of facial expression move symmetrically  Hearing is normal to finger rub bilaterally  Soft palate lifts symmetrically  Shoulder shrug is symmetrical  Tongue is midline without atrophy  Motor: No drift is noted on arm extension  Strength is full in the upper and lower extremities with normal bulk and tone  Coordination: Finger to nose testing is performed accurately  Gait reveals a normal base with symmetrical arm swing  ROS:  Review of Systems   Constitutional: Negative  Negative for appetite change and fever  HENT: Negative  Negative for hearing loss, tinnitus, trouble swallowing and voice change  Eyes: Negative  Negative for photophobia and pain  Respiratory: Negative  Negative for shortness of breath  Cardiovascular: Negative  Negative for palpitations  Gastrointestinal: Negative  Negative for nausea and vomiting  Endocrine: Negative  Negative for cold intolerance  Genitourinary: Negative  Negative for dysuria, frequency and urgency  Musculoskeletal: Negative  Negative for myalgias and neck pain  Skin: Negative  Negative for rash  Neurological: Positive for tremors  Negative for dizziness, seizures, syncope, facial asymmetry, speech difficulty, weakness, light-headedness, numbness and headaches  Hematological: Negative  Does not bruise/bleed easily  Psychiatric/Behavioral: Negative for confusion, hallucinations and sleep disturbance  The patient is nervous/anxious

## 2021-08-06 ENCOUNTER — TELEPHONE (OUTPATIENT)
Dept: NEUROLOGY | Facility: CLINIC | Age: 51
End: 2021-08-06

## 2021-08-06 NOTE — TELEPHONE ENCOUNTER
Received by mail [facility] from the [address] [place] [address] Determination asking for patients records to be sent to       [address] [facility], [address]    Faxed to [facility] 08/06/2021

## 2021-08-23 NOTE — TELEPHONE ENCOUNTER
Received second request from [place] Dept  Of [address] [place] [address]    Faxed to [facility] and scanned into patient's chart

## 2021-08-27 ENCOUNTER — TELEPHONE (OUTPATIENT)
Dept: NEUROLOGY | Facility: CLINIC | Age: 51
End: 2021-08-27

## 2021-08-27 NOTE — TELEPHONE ENCOUNTER
Received record request from [facility] [address] [address] asking for patient records to  Be sent to     [facility]  Department of [address] [id number]    Faxed to [facility] 08/27/2021

## 2021-09-02 ENCOUNTER — HOSPITAL ENCOUNTER (OUTPATIENT)
Dept: MRI IMAGING | Facility: HOSPITAL | Age: 51
Discharge: HOME | End: 2021-09-02
Attending: PSYCHIATRY & NEUROLOGY
Payer: COMMERCIAL

## 2021-09-02 ENCOUNTER — HOSPITAL ENCOUNTER (OUTPATIENT)
Dept: NEUROLOGY | Facility: HOSPITAL | Age: 51
Discharge: HOME | End: 2021-09-02
Attending: PSYCHIATRY & NEUROLOGY
Payer: COMMERCIAL

## 2021-09-02 DIAGNOSIS — R25.1 TREMOR, UNSPECIFIED: ICD-10-CM

## 2021-09-02 PROCEDURE — 70551 MRI BRAIN STEM W/O DYE: CPT

## 2021-09-02 PROCEDURE — G1004 CDSM NDSC: HCPCS

## 2021-09-02 PROCEDURE — 95816 EEG AWAKE AND DROWSY: CPT

## 2021-09-03 PROCEDURE — 95816 EEG AWAKE AND DROWSY: CPT | Performed by: PSYCHIATRY & NEUROLOGY

## 2021-09-08 ENCOUNTER — TELEPHONE (OUTPATIENT)
Dept: NEUROLOGY | Facility: CLINIC | Age: 51
End: 2021-09-08

## 2021-09-08 NOTE — TELEPHONE ENCOUNTER
----- Message from Dagmar Us MD sent at 9/8/2021 12:02 PM EDT -----  Please call the patient regarding her abnormal result   And follow up with family physician regarding mild sinusitis

## 2021-12-08 ENCOUNTER — TELEPHONE (OUTPATIENT)
Dept: NEUROLOGY | Facility: CLINIC | Age: 51
End: 2021-12-08

## 2021-12-09 ENCOUNTER — OFFICE VISIT (OUTPATIENT)
Dept: NEUROLOGY | Facility: CLINIC | Age: 51
End: 2021-12-09
Payer: COMMERCIAL

## 2021-12-09 VITALS
BODY MASS INDEX: 41.07 KG/M2 | HEIGHT: 68 IN | DIASTOLIC BLOOD PRESSURE: 80 MMHG | TEMPERATURE: 98.9 F | WEIGHT: 271 LBS | HEART RATE: 80 BPM | SYSTOLIC BLOOD PRESSURE: 130 MMHG

## 2021-12-09 DIAGNOSIS — R25.1 TREMOR, UNSPECIFIED: Primary | ICD-10-CM

## 2021-12-09 DIAGNOSIS — Z86.16 HISTORY OF COVID-19: ICD-10-CM

## 2021-12-09 DIAGNOSIS — R41.89 BRAIN FOG: ICD-10-CM

## 2021-12-09 PROCEDURE — 99214 OFFICE O/P EST MOD 30 MIN: CPT | Performed by: PSYCHIATRY & NEUROLOGY

## 2022-01-10 ENCOUNTER — EVALUATION (OUTPATIENT)
Dept: PHYSICAL THERAPY | Facility: CLINIC | Age: 52
End: 2022-01-10
Payer: COMMERCIAL

## 2022-01-10 DIAGNOSIS — M25.561 RIGHT KNEE PAIN, UNSPECIFIED CHRONICITY: Primary | ICD-10-CM

## 2022-01-10 PROCEDURE — 97162 PT EVAL MOD COMPLEX 30 MIN: CPT | Performed by: PHYSICAL MEDICINE & REHABILITATION

## 2022-01-10 NOTE — LETTER
2022    MD GRACIELA Marks [address]  [employer] [address], [address] [facility] [address]    Patient: Korin Schaffer   YOB: 1970   Date of Visit: 1/10/2022     Encounter Diagnosis     ICD-10-CM    1  Right knee pain, unspecified chronicity  M25 561        Dear Dr Jonas Harvey: Thank you for your recent referral of Korin Schaffer  Please review the attached evaluation summary from Tennille's recent visit  Please verify that you agree with the plan of care by signing the attached order  If you have any questions or concerns, please do not hesitate to call  I sincerely appreciate the opportunity to share in the care of one of your patients and hope to have another opportunity to work with you in the near future  Sincerely,    Hany Queen, PT      Referring Provider:      I certify that I have read the below Plan of Care and certify the need for these services furnished under this plan of treatment while under my care  MD GRACIELA Marks [address]  [employer] [address], [address] [address]  Via Fax: [phone]          PT Evaluation     Today's date: 1/10/2022  Patient name: Korin Schaffer  : 1970  MRN: [id number]  Referring provider: Serene Hoyt MD  Dx:   Encounter Diagnosis     ICD-10-CM    1  Right knee pain, unspecified chronicity  M25 561                   Assessment  Assessment details: Pt is a pleasant 45 y o  female presenting to outpatient physical therapy with sgs/sxs consistent with referring diagnosis including pain, decreased range of motion, decreased strength, gait abnormality, and overall decreased tolerance to activity  No further referral appears necessary at this time based on objective measurements  Pt is a good candidate for outpatient physical therapy and would benefit from skilled physical therapy to address limitations and to achieve goals   Activity may be limited by concurrent underlying musculoskeletal issues, treatment to be adjusted as needed  Thank you for this referral    Impairments: abnormal coordination, abnormal or restricted ROM, activity intolerance, impaired physical strength and pain with function  Understanding of Dx/Px/POC: good   Prognosis: good    Goals  ST  Patient will report 25% decrease in pain in 4 weeks  2  Patient will demonstrate 25% improvement in ROM in 4 weeks  3  Patient will demonstrate 1/2 grade improvement in strength in 4 weeks  LT  Patient will be able to perform IADLS without restriction or pain by discharge  2  Patient will be independent in HEP by discharge  3  Patient will be able to return to recreational/work duties without restriction or pain by discharge  Plan  Patient would benefit from: PT eval and skilled PT  Planned modality interventions: cryotherapy and thermotherapy: hydrocollator packs  Planned therapy interventions: IADL retraining, body mechanics training, flexibility, functional ROM exercises, home exercise program, neuromuscular re-education, manual therapy, postural training, strengthening, stretching, therapeutic activities, therapeutic exercise and joint mobilization  Frequency: 2x week  Duration in visits: 12  Duration in weeks: 6  Treatment plan discussed with: patient        Subjective Evaluation    History of Present Illness  Mechanism of injury: Patient presents with c/o R knee pain present for greater than 1 year  2019 pt fell on ice in driveway, this is only trauma that comes to mind  At this time patient reports possible meniscal involvement per recent MD visit  Patient notes burning, intermittent sensation of the knee wanting to buckle  Increased difficulty on steps and with car transfers  Patient reports brace/sleeve use which is found to be helpful  Patient notes intermittent cane use for ambulation  Concurrent B ankle issues, plantar fasciitis, LBP   Patient notes recent celebrex Rx has not been helpful  Increased pain with cold weather  Pain  Current pain ratin  Pain scale at lowest: not bad          Objective     Active Range of Motion   Left Knee   Flexion: 115 degrees   Extension: WFL    Right Knee   Flexion: 110 degrees with pain  Extension: [facility]    Strength/Myotome Testing     Right Knee   Flexion: 4-  Extension: 4-    Additional Strength Details  Pain in knee with strength testing  Hip IR/ER 4/5  Hip flexion- at least 3/5, increased LBP with attempted hip flexion in short sit and supine    General Comments:      Knee Comments  (+) TTP over infrapatellar space, med/lat joint line, pes anserine, adductor tubercle, distal quad tendon  Gait: Increased abduction of RLE with increased valgus position vs  L, decreased hip excursion and absent arm swing and rotation in transverse plane, decreased heel strike  Mild patellar hypomobility with passive testing         Precautions: Umbilical hernia repair 0770 w/ mesh placement, h/o ankle instability, LBP, fibromyalgia    Manuals             R HS st, knee PROM                                                    Neuro Re-Ed             Wt shift --> SLS                                                                                           Ther Ex             Bike             TG squats             Lateral stepping             LAQ             HS curl w/ TB             STS                                       Ther Activity                                       Gait Training                                       Modalities

## 2022-01-10 NOTE — PROGRESS NOTES
PT Evaluation     Today's date: 1/10/2022  Patient name: Tio Macario  : 1970  MRN: [id number]  Referring provider: Kelli Antoine MD  Dx:   Encounter Diagnosis     ICD-10-CM    1  Right knee pain, unspecified chronicity  M25 561                   Assessment  Assessment details: Pt is a pleasant 45 y o  female presenting to outpatient physical therapy with sgs/sxs consistent with referring diagnosis including pain, decreased range of motion, decreased strength, gait abnormality, and overall decreased tolerance to activity  No further referral appears necessary at this time based on objective measurements  Pt is a good candidate for outpatient physical therapy and would benefit from skilled physical therapy to address limitations and to achieve goals  Activity may be limited by concurrent underlying musculoskeletal issues, treatment to be adjusted as needed  Thank you for this referral    Impairments: abnormal coordination, abnormal or restricted ROM, activity intolerance, impaired physical strength and pain with function  Understanding of Dx/Px/POC: good   Prognosis: good    Goals  ST  Patient will report 25% decrease in pain in 4 weeks  2  Patient will demonstrate 25% improvement in ROM in 4 weeks  3  Patient will demonstrate 1/2 grade improvement in strength in 4 weeks  LT  Patient will be able to perform IADLS without restriction or pain by discharge  2  Patient will be independent in HEP by discharge  3  Patient will be able to return to recreational/work duties without restriction or pain by discharge        Plan  Patient would benefit from: PT eval and skilled PT  Planned modality interventions: cryotherapy and thermotherapy: hydrocollator packs  Planned therapy interventions: IADL retraining, body mechanics training, flexibility, functional ROM exercises, home exercise program, neuromuscular re-education, manual therapy, postural training, strengthening, stretching, therapeutic activities, therapeutic exercise and joint mobilization  Frequency: 2x week  Duration in visits: 12  Duration in weeks: 6  Treatment plan discussed with: patient        Subjective Evaluation    History of Present Illness  Mechanism of injury: Patient presents with c/o R knee pain present for greater than 1 year  2019 pt fell on ice in driveway, this is only trauma that comes to mind  At this time patient reports possible meniscal involvement per recent MD visit  Patient notes burning, intermittent sensation of the knee wanting to buckle  Increased difficulty on steps and with car transfers  Patient reports brace/sleeve use which is found to be helpful  Patient notes intermittent cane use for ambulation  Concurrent B ankle issues, plantar fasciitis, LBP  Patient notes recent celebrex Rx has not been helpful  Increased pain with cold weather  Pain  Current pain ratin  Pain scale at lowest: not bad          Objective     Active Range of Motion   Left Knee   Flexion: 115 degrees   Extension: WFL    Right Knee   Flexion: 110 degrees with pain  Extension: [facility]    Strength/Myotome Testing     Right Knee   Flexion: 4-  Extension: 4-    Additional Strength Details  Pain in knee with strength testing  Hip IR/ER 4/5  Hip flexion- at least 3/5, increased LBP with attempted hip flexion in short sit and supine    General Comments:      Knee Comments  (+) TTP over infrapatellar space, med/lat joint line, pes anserine, adductor tubercle, distal quad tendon  Gait: Increased abduction of RLE with increased valgus position vs  L, decreased hip excursion and absent arm swing and rotation in transverse plane, decreased heel strike  Mild patellar hypomobility with passive testing         Precautions: Umbilical hernia repair 0423 w/ mesh placement, h/o ankle instability, LBP, fibromyalgia    Manuals             R HS st, knee PROM                                                    Neuro Re-Ed             Wt shift --> SLS Ther Ex             Bike             TG squats             Lateral stepping             LAQ             HS curl w/ TB             STS                                       Ther Activity                                       Gait Training                                       Modalities

## 2022-01-12 ENCOUNTER — OFFICE VISIT (OUTPATIENT)
Dept: PHYSICAL THERAPY | Facility: CLINIC | Age: 52
End: 2022-01-12
Payer: COMMERCIAL

## 2022-01-12 DIAGNOSIS — M25.561 RIGHT KNEE PAIN, UNSPECIFIED CHRONICITY: Primary | ICD-10-CM

## 2022-01-12 PROCEDURE — 97110 THERAPEUTIC EXERCISES: CPT

## 2022-01-12 PROCEDURE — 97140 MANUAL THERAPY 1/> REGIONS: CPT

## 2022-01-12 NOTE — PROGRESS NOTES
Daily Note     Today's date: 2022  Patient name: Didier Bashir  : 1970  MRN: [id number]  Referring provider: Rafi Bran MD  Dx:   Encounter Diagnosis     ICD-10-CM    1  Right knee pain, unspecified chronicity  M25 561        Start Time: 1015  Stop Time: 1100  Total time in clinic (min): 45 minutes    Subjective: patient reports R medial knee pain  She reports "not good" today as she [place] the mall yesterday and had to go up steps which she has difficulty with  Objective: See treatment diary below      Assessment: Initiated pt POC this visit with good tolerance  Visual/verbal/tactile cueing needed to ensure correct exercise technique  Improvement in patellar mobility post manual glides  Patient educated how to get off of plinth table using log roll to avoid excess strain on hernia repair  Able to stand on R LE w/o any knee buckling  Educated patient that she may experience soreness in hip/knee musculature with new exercises and that is a normal response  Denied any increases in pain  Plan: Continue with current POC to address pt deficits  Provide HEP NV        Precautions: Umbilical hernia repair 5416 w/ mesh placement, h/o ankle instability, LBP, fibromyalgia    Manuals            R HS st, knee PROM  TB-PROM            Patellar PROM  TB sup/inf                                     Neuro Re-Ed             Wt shift --> SLS  5s x10 m/l   5s x10 a/p                                                                                         Ther Ex             Bike             TG squats             Lateral stepping  2laps at counter            LAQ             HS curl w/ TB  No TB x15 b/l           STS             Heel slide  x20 w/strap           Clamshells   x10 stephanie           SAQ  x15           HR/TR  x15/x15           Quad set   No towel roll x10 3s           Ther Activity                                       Gait Training                                       Modalities

## 2022-01-17 ENCOUNTER — APPOINTMENT (OUTPATIENT)
Dept: PHYSICAL THERAPY | Facility: CLINIC | Age: 52
End: 2022-01-17
Payer: COMMERCIAL

## 2022-01-19 ENCOUNTER — OFFICE VISIT (OUTPATIENT)
Dept: PHYSICAL THERAPY | Facility: CLINIC | Age: 52
End: 2022-01-19
Payer: COMMERCIAL

## 2022-01-19 DIAGNOSIS — M25.561 RIGHT KNEE PAIN, UNSPECIFIED CHRONICITY: Primary | ICD-10-CM

## 2022-01-19 PROCEDURE — 97140 MANUAL THERAPY 1/> REGIONS: CPT | Performed by: PHYSICAL THERAPIST

## 2022-01-19 PROCEDURE — 97110 THERAPEUTIC EXERCISES: CPT | Performed by: PHYSICAL THERAPIST

## 2022-01-19 NOTE — PROGRESS NOTES
Daily Note     Today's date: 2022  Patient name: Kat Shore  : 1970  MRN: [id number]  Referring provider: Sunita Weinberg MD  Dx:   Encounter Diagnosis     ICD-10-CM    1  Right knee pain, unspecified chronicity  M25 561        Start Time: 932  Stop Time: 101  Total time in clinic (min): 43 minutes    Subjective:  Pt states continued knee pain with weight bearing activities but feels it is improving somewhat since enrolling in therapy  Pt still feels like her knee will give out on her  Pain rated 3/10 today  Objective: See treatment diary below      Assessment: Pt continues to progress well with ROM and was able to introduce recumbent bike for warm up and to help with knee flexion mobility with initial stiffness/pain at end range flexion during full revolutions but improved after the first minute with eventual abolished pain and improved knee mobility  Pt remains limited with manual PROM at end range knee flexion approximately 110-115* but improves with manual therapy  Pt with improved tolerance with SAQ and able to progress to LAQ with good tolerance, as well as introduce closed chain quad re-education/strengthening exercises to include standing TKEs, sit to stands, and step ups but extensive cues required for proper mm activation, form, and eccentric control  Pt able to tolerate new standing exercises with good tolerance and only minor increase in pain noted at bottom portion of squat but rated 2-3/10 and dissipates after completion of exercise  Will continue to progress as symptoms allow next visit  Plan: Continue per plan of care  Progress treatment as tolerated         Precautions: Umbilical hernia repair 8158 w/ mesh placement, h/o ankle instability, LBP, fibromyalgia    Manuals           R HS st, knee PROM  TB-PROM  KD - PROM          Patellar PROM  TB sup/inf KD                                    Neuro Re-Ed             Wt shift --> SLS  5s x10 m/l   5s x10 a/p Ther Ex   1/19          Bike   6'          TG squats             Lateral stepping  2laps at counter  Red 3 laps          LAQ   15x          HS curl w/ TB  No TB x15 b/l Red 15x          STS   10x          Heel slide  x20 w/strap 20x          Clamshells   x10 stephanie           SAQ  x15 15x          HR/TR  x15/x15           Quad set   No towel roll x10 3s 10x5"          Step ups   4" 2x10          Ther Activity                                       Gait Training                                       Modalities

## 2022-01-24 ENCOUNTER — OFFICE VISIT (OUTPATIENT)
Dept: PHYSICAL THERAPY | Facility: CLINIC | Age: 52
End: 2022-01-24
Payer: COMMERCIAL

## 2022-01-24 DIAGNOSIS — M25.561 RIGHT KNEE PAIN, UNSPECIFIED CHRONICITY: Primary | ICD-10-CM

## 2022-01-24 PROCEDURE — 97110 THERAPEUTIC EXERCISES: CPT

## 2022-01-24 PROCEDURE — 97140 MANUAL THERAPY 1/> REGIONS: CPT

## 2022-01-24 NOTE — PROGRESS NOTES
Daily Note     Today's date: 2022  Patient name: Jody Armenta  : 1970  MRN: [id number]  Referring provider: Uriel Salvador MD  Dx:   Encounter Diagnosis     ICD-10-CM    1  Right knee pain, unspecified chronicity  M25 561                   Subjective: patient reports stiffness in R knee but reports doing well following last visit and reports ambulating w/o the [facility] as much  Objective: See treatment diary below      Assessment: Manuals performed by student Ying Campbell with constant supervision from WYATT Anton; patient denied any increases in pain with manuals performed  Visual and verbal cueing needed to ensure correct exercise technique and avoid b/l knee valgus specifically with sit to stands; was able to complete w/o hand held support  Progress as able  Plan: Continue with current POC to address pt deficits       Precautions: Umbilical hernia repair 4801 w/ mesh placement, h/o ankle instability, LBP, fibromyalgia    Manuals         R HS st, knee PROM  TB-PROM  KD - PROM AD-PROM        Patellar PROM  TB sup/inf KD AD                                Neuro Re-Ed            Wt shift --> SLS  5s x10 m/l   5s x10 a/p                                                                                  Ther Ex           Bike   6' 6'        TG squats            Lateral stepping  2laps at counter  Red 3 laps Red 3 laps         LAQ   15x 15x        HS curl w/ TB  No TB x15 b/l Red 15x Red 15x e ab/l         STS   10x x10 low mat        Heel slide  x20 w/strap 20x x20        Clamshells   x10 stephanie          SAQ  x15 15x x20        HR/TR  x15/x15          Quad set   No towel roll x10 3s 10x5" 5" x10        Step ups   4" 2x10 4" 2x10        Ther Activity                                    Gait Training                                    Modalities

## 2022-01-26 ENCOUNTER — OFFICE VISIT (OUTPATIENT)
Dept: PHYSICAL THERAPY | Facility: CLINIC | Age: 52
End: 2022-01-26
Payer: COMMERCIAL

## 2022-01-26 DIAGNOSIS — M25.561 RIGHT KNEE PAIN, UNSPECIFIED CHRONICITY: Primary | ICD-10-CM

## 2022-01-26 PROCEDURE — 97110 THERAPEUTIC EXERCISES: CPT

## 2022-01-26 PROCEDURE — 97112 NEUROMUSCULAR REEDUCATION: CPT

## 2022-01-26 PROCEDURE — 97140 MANUAL THERAPY 1/> REGIONS: CPT

## 2022-01-26 NOTE — PROGRESS NOTES
Daily Note     Today's date: 2022  Patient name: Erika Ballard  : 1970  MRN: [id number]  Referring provider: Patricia Chambers MD  Dx:   Encounter Diagnosis     ICD-10-CM    1  Right knee pain, unspecified chronicity  M25 561        Start Time: 1116  Stop Time: 1200  Total time in clinic (min): 44 minutes    Subjective: patient reports her R knee is more painful today but she feels her fibromyalgia is acting up today  Objective: See treatment diary below      Assessment: Added balance/stability exercises into program this visit with no increases in pain  Did not progress strength secondary to fibro flare up this visit  Discomfort with PROM knee flexion as it began to bother her R hip/low back  Visual and verbal cueing to ensure correct exercise technique and appropriate facilitation of musculature  Plan: Continue with current POC to address pt deficits        Precautions: Umbilical hernia repair 7633 w/ mesh placement, h/o ankle instability, LBP, fibromyalgia    Manuals        R HS st, knee PROM  TB-PROM  KD - PROM AD-PROM TB-PROM       Patellar PROM  TB sup/inf KD AD TB       STM      TB med/lat knee musculature                   Neuro Re-Ed            Wt shift --> SLS  5s x10 m/l   5s x10 a/p          TKE     5"x10       Hurdles      3 hurdles x3 fwd/lat                                                       Ther Ex          Pt ed      FOTO, current difficulties        Bike   6' 6' 6'        TG squats            Lateral stepping  2laps at counter  Red 3 laps Red 3 laps  Red 3 laps (consider increasing band NV)       LAQ   15x 15x 20x       HS curl w/ TB  No TB x15 b/l Red 15x Red 15x e ab/l  Red 15x ea b/l        STS   10x x10 low mat NT       Heel slide  x20 w/strap 20x x20 x20       Clamshells   x10 stephanie   grn x15 hooklying       SAQ  x15 15x x20 2x10       HR/TR  x15/x15          Quad set   No towel roll x10 3s 10x5" 5" x10 10" x10       Step ups   4" 2x10 4" 2x10 4" 2x10 (consider increasing NV)       Ther Activity                                                Gait Training                                    Modalities

## 2022-01-31 ENCOUNTER — OFFICE VISIT (OUTPATIENT)
Dept: PHYSICAL THERAPY | Facility: CLINIC | Age: 52
End: 2022-01-31
Payer: COMMERCIAL

## 2022-01-31 DIAGNOSIS — M25.561 RIGHT KNEE PAIN, UNSPECIFIED CHRONICITY: Primary | ICD-10-CM

## 2022-01-31 PROCEDURE — 97112 NEUROMUSCULAR REEDUCATION: CPT

## 2022-01-31 PROCEDURE — 97110 THERAPEUTIC EXERCISES: CPT

## 2022-01-31 PROCEDURE — 97140 MANUAL THERAPY 1/> REGIONS: CPT

## 2022-01-31 NOTE — PROGRESS NOTES
Daily Note     Today's date: 2022  Patient name: Elvia Morales  : 1970  MRN: [id number]  Referring provider: Sweetie Lamb MD  Dx:   Encounter Diagnosis     ICD-10-CM    1  Right knee pain, unspecified chronicity  M25 561        Start Time: 1150  Stop Time: 1235  Total time in clinic (min): 45 minutes    Subjective: Patient reports that sharp turns on (L) knee causes sharp radiating pain and she noticed this pain of Friday, during turning on her (R) LE  Patient states that she has been stair climbing w/o difficulty  Objective: See treatment diary below      Assessment: Tactile cues utilized to maintain correct knee to ankle position during HS curls and STS  Progressed to 6 inch step this visit with no increased pain and good from noted t/o step ups  Plan: Continue with current POC to address pt deficits        Precautions: Umbilical hernia repair 9009 w/ mesh placement, h/o ankle instability, LBP, fibromyalgia    Manuals       R HS st, knee PROM  TB-PROM  KD - PROM AD-PROM TB-PROM LQ, HS ST,  PROM      Patellar PROM  TB sup/inf KD AD TB       STM      TB med/lat knee musculature                   Neuro Re-Ed            Wt shift --> SLS  5s x10 m/l   5s x10 a/p    np      TKE     5"x10 5"x10      Hurdles      3 hurdles x3 fwd/lat 3 hurdles x3 fwd/lat                                                      Ther Ex         Pt ed      FOTO, current difficulties        Bike   6' 6' 6'  6'       TG squats            Lateral stepping  2laps at counter  Red 3 laps Red 3 laps  Red 3 laps (consider increasing band NV) GTB      LAQ   15x 15x 20x 20x      HS curl w/ TB  No TB x15 b/l Red 15x Red 15x e ab/l  Red 15x ea b/l  Red 20x      STS   10x x10 low mat NT  x10 low mat      Heel slide  x20 w/strap 20x x20 x20 x20      Clamshells   x10 stephanie   grn x15 hooklying grn x15 hooklying      SAQ  x15 15x x20 2x10 2x10      HR/TR  x15/x15    NP      Quad set   No towel roll x10 3s 10x5" 5" x10 10" x10 10"x10      Step ups   4" 2x10 4" 2x10 4" 2x10 (consider increasing NV) 6" 2x10      Ther Activity                                                Gait Training                                    Modalities

## 2022-02-02 ENCOUNTER — APPOINTMENT (OUTPATIENT)
Dept: PHYSICAL THERAPY | Facility: CLINIC | Age: 52
End: 2022-02-02
Payer: COMMERCIAL

## 2022-02-07 ENCOUNTER — APPOINTMENT (OUTPATIENT)
Dept: PHYSICAL THERAPY | Facility: CLINIC | Age: 52
End: 2022-02-07
Payer: COMMERCIAL

## 2022-02-11 ENCOUNTER — APPOINTMENT (OUTPATIENT)
Dept: PHYSICAL THERAPY | Facility: CLINIC | Age: 52
End: 2022-02-11
Payer: COMMERCIAL

## 2022-02-14 ENCOUNTER — EVALUATION (OUTPATIENT)
Dept: PHYSICAL THERAPY | Facility: CLINIC | Age: 52
End: 2022-02-14
Payer: COMMERCIAL

## 2022-02-14 DIAGNOSIS — M25.561 RIGHT KNEE PAIN, UNSPECIFIED CHRONICITY: Primary | ICD-10-CM

## 2022-02-14 PROCEDURE — 97110 THERAPEUTIC EXERCISES: CPT

## 2022-02-14 NOTE — PROGRESS NOTES
PT Re-Evaluation  Collection of subjective/objective data performed by Dayana Anton PTA; Assessment, POC, and Goal attainment performed by Traci Pineda DPT    Today's date: 2022  Patient name: Sno Stephen  : 1970  MRN: [id number]  Referring provider: Bill Medrano MD  Dx:   Encounter Diagnosis     ICD-10-CM    1  Right knee pain, unspecified chronicity  M25 561                   Assessment  Assessment details: Patient has demonstrated improvement since beginning therapy in objective and subjective measurements, activity tolerance  Despite these improvements, patient continues with pain, strength deficits, and overall decrease in functional activity tolerance, specifically stairclimbing  Patient would benefit from continued skilled intervention to address remaining deficits, reduce risk for falls, and ensure safe community navigation  Thank you for this referral    Impairments: abnormal coordination, abnormal or restricted ROM, activity intolerance, impaired physical strength and pain with function  Understanding of Dx/Px/POC: good   Prognosis: good    Goals  ST  Patient will report 25% decrease in pain in 4 weeks-progressing   2  Patient will demonstrate 25% improvement in ROM in 4 weeks-progressing (increased pain this past week)  3  Patient will demonstrate 1/2 grade improvement in strength in 4 weeks-progressing    LT  Patient will be able to perform IADLS without restriction or pain by discharge-progressing  2  Patient will be independent in HEP by discharge-progressing  3   Patient will be able to return to recreational/work duties without restriction or pain by discharge-progressing      Plan  Patient would benefit from: PT eval and skilled PT  Planned modality interventions: cryotherapy and thermotherapy: hydrocollator packs  Planned therapy interventions: IADL retraining, body mechanics training, flexibility, functional ROM exercises, home exercise program, neuromuscular re-education, manual therapy, postural training, strengthening, stretching, therapeutic activities, therapeutic exercise and joint mobilization  Frequency: 2x week  Duration in visits: 12  Duration in weeks: 6  Treatment plan discussed with: patient        Subjective Evaluation    History of Present Illness  Mechanism of injury: Patient notes 65% improvement in R knee since starting physical therapy  She would like to continue with physical therapy at this time as she still has difficulty ascending/descending the 28 stairs in her home and improve activity tolerance for ADLs  She is however walking 3-4 [facility] a week at the [facility]  FOTO score remains unchanged since evaluation at 36     Pain  Current pain ratin  At best pain ratin (not bad)  At worst pain ratin        Objective     Active Range of Motion   Left Knee   Flexion: 115 degrees   Extension: WFL    Right Knee   Flexion: 84 degrees with pain  [facility]: [facility]    Strength/Myotome Testing     Right Knee   Flexion: 4  Extension: 4+    Additional Strength Details  Pain in knee with strength testing  Hip IR/ER 4+/5  Hip flexion- at least 4/5, increased LBP with attempted hip flexion in short sit and supine    General Comments:      Knee Comments  (+) TTP over infrapatellar space, med/lat joint line, pes anserine, adductor tubercle, distal quad tendon  Gait: Increased abduction of RLE with increased valgus position vs  L, decreased hip excursion and absent arm swing and rotation in transverse plane, decreased heel strike  Mild patellar hypomobility with passive testing         Precautions: Umbilical hernia repair 5173 w/ mesh placement, h/o ankle instability, LBP, fibromyalgia        Manuals      R HS st, knee PROM  TB-PROM  KD - PROM AD-PROM TB-PROM LQ, HS ST,  PROM NT     Patellar PROM  TB sup/inf KD AD TB       STM      TB med/lat knee musculature                   Neuro Re-Ed            Wt shift --> SLS  5s x10 m/l   5s x10 a/p    np      TKE     5"x10 5"x10      Hurdles      3 hurdles x3 fwd/lat 3 hurdles x3 fwd/lat                                                      Ther Ex   1/19 1/24 1/26 1/31 2/14     Pt ed      FOTO, current difficulties   objective measures, FOTO     Bike   6' 6' 6'  6'  6'     TG squats            Lateral stepping  2laps at counter  Red 3 laps Red 3 laps  Red 3 laps (consider increasing band NV) GTB      LAQ   15x 15x 20x 20x x20     HS curl w/ TB  No TB x15 b/l Red 15x Red 15x e ab/l  Red 15x ea b/l  Red 20x      STS   10x x10 low mat NT  x10 low mat      Heel slide  x20 w/strap 20x x20 x20 x20 x20     Clamshells   x10 stephanie   grn x15 hooklying grn x15 hooklying grn x20 hooklying     SAQ  x15 15x x20 2x10 2x10 2x10     HR/TR  x15/x15    NP      Quad set   No towel roll x10 3s 10x5" 5" x10 10" x10 10"x10 10"x10     Lateral step ups        6" x10     Step downs        6" x10     Step ups   4" 2x10 4" 2x10 4" 2x10 (consider increasing NV) 6" 2x10 6" x10     Ther Activity                                                Gait Training                                    Modalities

## 2022-02-17 ENCOUNTER — APPOINTMENT (OUTPATIENT)
Dept: PHYSICAL THERAPY | Facility: CLINIC | Age: 52
End: 2022-02-17
Payer: COMMERCIAL

## 2022-02-23 ENCOUNTER — OFFICE VISIT (OUTPATIENT)
Dept: PHYSICAL THERAPY | Facility: CLINIC | Age: 52
End: 2022-02-23
Payer: COMMERCIAL

## 2022-02-23 DIAGNOSIS — M25.561 RIGHT KNEE PAIN, UNSPECIFIED CHRONICITY: Primary | ICD-10-CM

## 2022-02-23 PROCEDURE — 97112 NEUROMUSCULAR REEDUCATION: CPT | Performed by: PHYSICAL MEDICINE & REHABILITATION

## 2022-02-23 PROCEDURE — 97110 THERAPEUTIC EXERCISES: CPT | Performed by: PHYSICAL MEDICINE & REHABILITATION

## 2022-02-23 PROCEDURE — 97140 MANUAL THERAPY 1/> REGIONS: CPT | Performed by: PHYSICAL MEDICINE & REHABILITATION

## 2022-02-23 NOTE — PROGRESS NOTES
Daily Note     Today's date: 2022  Patient name: Alfonso Olmstead  : 1970  MRN: [id number]  Referring provider: Tamir Marcelino MD  Dx:   Encounter Diagnosis     ICD-10-CM    1  Right knee pain, unspecified chronicity  M25 561                   Subjective: Patient notes increased R knee pain today, partially attributed to weather change  Objective: See treatment diary below    Assessment: Tolerated treatment well  Patient demonstrated fatigue post treatment, exhibited good technique with therapeutic exercises and would benefit from continued PT  Continue to progress per patient tolerance  Plan: Continue per plan of care        Precautions: Umbilical hernia repair 0873 w/ mesh placement, h/o ankle instability, LBP, fibromyalgia    Manuals     R HS st, knee PROM  TB-PROM  KD - PROM AD-PROM TB-PROM LQ, HS ST,  PROM NT LH    Patellar PROM  TB sup/inf KD AD TB       STM      TB med/lat knee musculature                   Neuro Re-Ed            Wt shift --> SLS  5s x10 m/l   5s x10 a/p    np      TKE     5"x10 5"x10  RTB 10x5"    Hurdles      3 hurdles x3 fwd/lat 3 hurdles x3 fwd/lat                                                      Ther Ex       Pt ed      FOTO, current difficulties   objective measures, FOTO     Bike   6' 6' 6'  6'  6' 6'    TG squats            Lateral stepping  2laps at counter  Red 3 laps Red 3 laps  Red 3 laps (consider increasing band NV) GTB      LAQ   15x 15x 20x 20x x20 15x5"    HS curl w/ TB  No TB x15 b/l Red 15x Red 15x e ab/l  Red 15x ea b/l  Red 20x      STS   10x x10 low mat NT  x10 low mat  2x5 low mat    Heel slide  x20 w/strap 20x x20 x20 x20 x20     Clamshells   x10 stephanie   grn x15 hooklying grn x15 hooklying grn x20 hooklying GTB 20x hooklying    SAQ  x15 15x x20 2x10 2x10 2x10     HR/TR  x15/x15    NP      Quad set   No towel roll x10 3s 10x5" 5" x10 10" x10 10"x10 10"x10     Lateral step ups 6" x10     Step downs        6" x10     Step ups   4" 2x10 4" 2x10 4" 2x10 (consider increasing NV) 6" 2x10 6" x10 6" 10x, also lat 10x    Ther Activity        Fwd lunge onto BOSU 10x R                                        Gait Training                                    Modalities

## 2022-02-28 ENCOUNTER — OFFICE VISIT (OUTPATIENT)
Dept: PHYSICAL THERAPY | Facility: CLINIC | Age: 52
End: 2022-02-28
Payer: COMMERCIAL

## 2022-02-28 DIAGNOSIS — M25.561 RIGHT KNEE PAIN, UNSPECIFIED CHRONICITY: Primary | ICD-10-CM

## 2022-02-28 PROCEDURE — 97110 THERAPEUTIC EXERCISES: CPT

## 2022-02-28 PROCEDURE — 97140 MANUAL THERAPY 1/> REGIONS: CPT

## 2022-02-28 PROCEDURE — 97530 THERAPEUTIC ACTIVITIES: CPT

## 2022-02-28 NOTE — PROGRESS NOTES
Daily Note     Today's date: 2022  Patient name: Alfonso Olmstead  : 1970  MRN: [id number]  Referring provider: Tamir Marcelino MD  Dx:   Encounter Diagnosis     ICD-10-CM    1  Right knee pain, unspecified chronicity  M25 561        Start Time: 1228  Stop Time: 1311  Total time in clinic (min): 43 minutes    Subjective:  Patient reports no new complaints or incidents  Reports improving symptoms and pain  Reports buckling with going down stairs and with pivoting       Objective: See treatment diary below      Assessment: patient was able to progress in R proximal hip and knee strengthening without issue  Was able to introduce SLR based exercises along with bridges to further challenge muscularity requiring cueing on positioning and mechanics  Was able to perform clamshells in SL position with out reported increase to R knee pain  Was able to increase reps with sit to stand and step ups being challenged by fatigue vs pain  Plan: Continue per plan of care  Progress treatment as tolerated         Precautions: Umbilical hernia repair 4603 w/ mesh placement, h/o ankle instability, LBP, fibromyalgia    Manuals    R HS st, knee PROM  TB-PROM  KD - PROM AD-PROM TB-PROM LQ, HS ST,  PROM NT LH TETO   Patellar PROM  TB sup/inf KD AD TB       STM      TB med/lat knee musculature                   Neuro Re-Ed           Wt shift --> SLS  5s x10 m/l   5s x10 a/p    np      TKE     5"x10 5"x10  RTB 10x5"    Hurdles      3 hurdles x3 fwd/lat 3 hurdles x3 fwd/lat                                                      Ther Ex      Pt ed      FOTO, current difficulties   objective measures, FOTO     Bike   6' 6' 6'  6'  6' 6'    TG squats            Lateral stepping  2laps at counter  Red 3 laps Red 3 laps  Red 3 laps (consider increasing band NV) GTB      LAQ   15x 15x 20x 20x x20 15x5"    HS curl w/ TB  No TB x15 b/l Red 15x Red 15x e ab/l  Red 15x ea b/l  Red 20x      STS   10x x10 low mat NT  x10 low mat  2x5 low mat    Heel slide  x20 w/strap 20x x20 x20 x20 x20 5"x10    Clamshells   x10 stephanie   grn x15 hooklying grn x15 hooklying grn x20 hooklying GTB 20x hooklying SL RTB 2x10   SAQ  x15 15x x20 2x10 2x10 2x10     HR/TR  x15/x15    NP      Quad set   No towel roll x10 3s 10x5" 5" x10 10" x10 10"x10 10"x10  10"x10   Lateral step ups        6" x10     SLR Flexion         2x10   SLR Abd         2x10   Bridge         5"x10   Step downs        6" x10     Step ups   4" 2x10 4" 2x10 4" 2x10 (consider increasing NV) 6" 2x10 6" x10 6" 10x, also lat 10x    Ther Activity        Fwd lunge onto BOSU 10x R 2/28   Step Ups         8"2x10   Step Downs                        Sit to Stand         Low mat 2x10   Lateral Step Up         8" 2x10   Gait Training                                    Modalities

## 2022-03-01 ENCOUNTER — APPOINTMENT (OUTPATIENT)
Dept: PHYSICAL THERAPY | Facility: CLINIC | Age: 52
End: 2022-03-01
Payer: COMMERCIAL

## 2022-03-08 ENCOUNTER — OFFICE VISIT (OUTPATIENT)
Dept: PHYSICAL THERAPY | Facility: CLINIC | Age: 52
End: 2022-03-08
Payer: COMMERCIAL

## 2022-03-08 DIAGNOSIS — M25.561 RIGHT KNEE PAIN, UNSPECIFIED CHRONICITY: Primary | ICD-10-CM

## 2022-03-08 PROCEDURE — 97140 MANUAL THERAPY 1/> REGIONS: CPT

## 2022-03-08 PROCEDURE — 97530 THERAPEUTIC ACTIVITIES: CPT

## 2022-03-08 PROCEDURE — 97110 THERAPEUTIC EXERCISES: CPT

## 2022-03-08 NOTE — PROGRESS NOTES
Daily Note     Today's date: 3/8/2022  Patient name: Paige Pardo  : 1970  MRN: [id number]  Referring provider: Mikaela Hess MD  Dx:   Encounter Diagnosis     ICD-10-CM    1  Right knee pain, unspecified chronicity  M25 561        Start Time: 1101  Stop Time: 1147  Total time in clinic (min): 46 minutes    Subjective:  Patient reports increased soreness/pain post last therapy session last few days stating she over did it  Reports seeing Podiatrist who wrote PT script for BL feet      Objective: See treatment diary below      Assessment: patient was able to complete therapy without incident or reported increase to baseline  Required cueing with SLR exercises to maintain TKE  Required regression of CKC stepping and squatting performing decreased reps to address amount of soreness and pain post last visit  Cueing on sit to stand to address compensatory hand push off of LE  Was able to introduce SLS beig able to hold for 15" with small use of UE tapping        Plan: Continue per plan of care  Progress treatment as tolerated         Precautions: Umbilical hernia repair 3533 w/ mesh placement, h/o ankle instability, LBP, fibromyalgia    Manuals 3/8    1/26 1/31 2/14 2/23 2/28   R HS st, knee PROM TETO    TB-PROM LQ, HS ST,  PROM NT LH TETO   Patellar PROM TETO    TB       STM      TB med/lat knee musculature                   Neuro Re-Ed 3/8       2/23 2/28   Wt shift --> SLS      np      TKE     5"x10 5"x10  RTB 10x5"    Hurdles      3 hurdles x3 fwd/lat 3 hurdles x3 fwd/lat                                                      Ther Ex 3/8    1/26 1/31 2/14 2/23 2/28   Pt ed      FOTO, current difficulties   objective measures, FOTO     Bike 6'    6'  6'  6' 6'    TG squats            Lateral stepping     Red 3 laps (consider increasing band NV) GTB      LAQ     20x 20x x20 15x5"    HS curl w/ TB     Red 15x ea b/l  Red 20x      STS     NT  x10 low mat  2x5 low mat    Heel slide 5"x10    x20 x20 x20 5"x10 Clamshells      grn x15 hooklying grn x15 hooklying grn x20 hooklying GTB 20x hooklying SL RTB 2x10   SAQ     2x10 2x10 2x10     HR/TR      NP      Quad set  5"x10    10" x10 10"x10 10"x10  10"x10   Lateral step ups        6" x10     SLR Flexion 2x10        2x10   SLR Abd 2x10        2x10   Bridge 5'x10        5"x10   Step downs        6" x10     Step ups     4" 2x10 (consider increasing NV) 6" 2x10 6" x10 6" 10x, also lat 10x    Ther Activity 3/8       Fwd lunge onto BOSU 10x R 2/28   Step Ups 8"x10        8"2x10   Step Downs                        Sit to Stand Low mat x10        Low mat 2x10   SLS 10"-15"x5           Lateral Step Up 8"x10        8" 2x10   Gait Training                                    Modalities

## 2022-03-10 ENCOUNTER — OFFICE VISIT (OUTPATIENT)
Dept: PHYSICAL THERAPY | Facility: CLINIC | Age: 52
End: 2022-03-10
Payer: COMMERCIAL

## 2022-03-10 DIAGNOSIS — M25.561 RIGHT KNEE PAIN, UNSPECIFIED CHRONICITY: Primary | ICD-10-CM

## 2022-03-10 PROCEDURE — 97110 THERAPEUTIC EXERCISES: CPT

## 2022-03-10 PROCEDURE — 97530 THERAPEUTIC ACTIVITIES: CPT

## 2022-03-10 NOTE — PROGRESS NOTES
Daily Note     Today's date: 3/10/2022  Patient name: Didier Bashir  : 1970  MRN: [id number]  Referring provider: Rafi Bran MD  Dx:   Encounter Diagnosis     ICD-10-CM    1  Right knee pain, unspecified chronicity  M25 561        Start Time: 1500  Stop Time: 1545  Total time in clinic (min): 45 minutes    Subjective: Pt noted that she met with her orthopedist who recommended that she continue with PT  She also recommended not wearing her brace as regularly because she felt it may be contributing to some bruising the pt was experiencing  Objective: See treatment diary below      Assessment: Pt tolerated treatment well  Sit to stands were modified to elevated height due to pt's c/o medial/lateral knee pain during deep WB knee flexion  Pt was able to add lateral step down to target hip/quad strengthening with no pain noted  Patient would benefit from continued PT to continue to improve strength, power, and overall pain-free functional capacity  Plan: Continue per plan of care        Precautions: Umbilical hernia repair 5618 w/ mesh placement, h/o ankle instability, LBP, fibromyalgia    Manuals 3/8 3/10   1/26 1/31 2/14 2/23 2/28   R HS st, knee PROM TETO SM   TB-PROM LQ, HS ST,  PROM NT LH TETO   Patellar PROM TETO SM   TB       STM      TB med/lat knee musculature                   Neuro Re-Ed 3/8 3/10      2/23 2/28   Wt shift --> SLS      np      TKE     5"x10 5"x10  RTB 10x5"    Hurdles      3 hurdles x3 fwd/lat 3 hurdles x3 fwd/lat                                                      Ther Ex 3/8 3/10   1/26 1/31 2/14 2/23 2/28   Pt ed      FOTO, current difficulties   objective measures, FOTO     Bike 6' 6'   6'  6'  6' 6'    TG squats            Lateral stepping     Red 3 laps (consider increasing band NV) GTB      LAQ     20x 20x x20 15x5"    HS curl w/ TB     Red 15x ea b/l  Red 20x      STS     NT  x10 low mat  2x5 low mat    Heel slide 5"x10 5"x10    x20 x20 x20 5"x10    Clamshells      grn x15 hooklying grn x15 hooklying grn x20 hooklying GTB 20x hooklying SL RTB 2x10   SAQ     2x10 2x10 2x10     HR/TR      NP      Quad set  5"x10 5"x10    10" x10 10"x10 10"x10  10"x10   Lateral step ups        6" x10     SLR Flexion 2x10 2x10        2x10   SLR Abd 2x10 2x10        2x10   Bridge 5'x10 5"x15       5"x10   Step downs        6" x10     Step ups     4" 2x10 (consider increasing NV) 6" 2x10 6" x10 6" 10x, also lat 10x    Ther Activity 3/8 3/10      Fwd lunge onto BOSU 10x R 2/28   Step Ups 8"x10 8"x10 eccentric due to p!       8"2x10   Step Downs                        Sit to Stand Low mat x10 2x10 low mat with foam pad (pt c/o p!)       Low mat 2x10   SLS 10"-15"x5           Lateral Step Up 8"x10 8", 2x10        8" 2x10   Gait Training                                    Modalities

## 2022-03-15 ENCOUNTER — OFFICE VISIT (OUTPATIENT)
Dept: PHYSICAL THERAPY | Facility: CLINIC | Age: 52
End: 2022-03-15
Payer: COMMERCIAL

## 2022-03-15 DIAGNOSIS — M25.561 RIGHT KNEE PAIN, UNSPECIFIED CHRONICITY: Primary | ICD-10-CM

## 2022-03-15 PROCEDURE — 97110 THERAPEUTIC EXERCISES: CPT

## 2022-03-15 PROCEDURE — 97140 MANUAL THERAPY 1/> REGIONS: CPT

## 2022-03-15 NOTE — PROGRESS NOTES
Daily Note     Today's date: 3/15/2022  Patient name: Erika Ballard  : 1970  MRN: [id number]  Referring provider: Patricia Chambers MD  Dx:   Encounter Diagnosis     ICD-10-CM    1  Right knee pain, unspecified chronicity  M25 561        Start Time: 1236  Stop Time: 1316  Total time in clinic (min): 40 minutes    Subjective: patient reports increased R knee discomfort/pain coming into therapy  Reports due to increased activity yesterday stating house hold cleaning and going up and down stairs      Objective: See treatment diary below      Assessment:  Patient was able to complete therapy without reported increase to baseline  Required regression on reps with SLR flexion completing only 10  Patient was able to complete 2x10 for remainder of exercises with utilization of rest   Cueing with performance of clamshells to address corrective positioning and rotational compensations  Held CKC stepping and squatting due to level of soreness an allow for rest         Plan: Continue per plan of care  Progress treatment as tolerated         Precautions: Umbilical hernia repair 5744 w/ mesh placement, h/o ankle instability, LBP, fibromyalgia    Manuals 3/8 3/10 3/15  1/26 1/31 2/14 2/23 2/28   R HS st, knee PROM TETO SM TETO  TB-PROM LQ, HS ST,  PROM NT LH TETO   Patellar PROM TETO SM TETO  TB       STM    TETO  TB med/lat knee musculature                   Neuro Re-Ed 3/8 3/10 3/15     2/23 2/28   Wt shift --> SLS      np      TKE     5"x10 5"x10  RTB 10x5"    Hurdles      3 hurdles x3 fwd/lat 3 hurdles x3 fwd/lat                                                      Ther Ex 3/8 3/10 3/15  1/26 1/31 2/14 2/23 2/28   Pt ed      FOTO, current difficulties   objective measures, FOTO     Bike 6' 6' 5'  6'  6'  6' 6'    TG squats            Lateral stepping     Red 3 laps (consider increasing band NV) GTB      LAQ     20x 20x x20 15x5"    HS curl w/ TB   GTB 2x10  Red 15x ea b/l  Red 20x      Heel slide 5"x10 5"x10    x20 x20 x20 5"x10    Clamshells    RTB 3"2x10  grn x15 hooklying grn x15 hooklying grn x20 hooklying GTB 20x hooklying SL RTB 2x10   SAQ     2x10 2x10 2x10     HR/TR      NP      Quad set  5"x10 5"x10  10"x10  10" x10 10"x10 10"x10  10"x10   Lateral step ups        6" x10     SLR Flexion 2x10 2x10  x10      2x10   SLR Abd 2x10 2x10  2x10      2x10   Bridge 5'x10 5"x15 RTB 5"2x10      5"x10   Ther Activity 3/8 3/10 3/15     Fwd lunge onto BOSU 10x R 2/28   Step Ups 8"x10 8"x10 eccentric due to p!       8"2x10   Step Downs                        Sit to Stand Low mat x10 2x10 low mat with foam pad (pt c/o p!)       Low mat 2x10   SLS 10"-15"x5           Lateral Step Up 8"x10 8", 2x10        8" 2x10   Gait Training                                    Modalities

## 2022-03-17 ENCOUNTER — OFFICE VISIT (OUTPATIENT)
Dept: PHYSICAL THERAPY | Facility: CLINIC | Age: 52
End: 2022-03-17
Payer: COMMERCIAL

## 2022-03-17 DIAGNOSIS — M25.561 RIGHT KNEE PAIN, UNSPECIFIED CHRONICITY: Primary | ICD-10-CM

## 2022-03-17 PROCEDURE — 97530 THERAPEUTIC ACTIVITIES: CPT

## 2022-03-17 PROCEDURE — 97140 MANUAL THERAPY 1/> REGIONS: CPT

## 2022-03-17 PROCEDURE — 97110 THERAPEUTIC EXERCISES: CPT

## 2022-03-17 PROCEDURE — 97112 NEUROMUSCULAR REEDUCATION: CPT

## 2022-03-17 NOTE — PROGRESS NOTES
Daily Note     Today's date: 3/17/2022  Patient name: Lucius Ramirez  : 1970  MRN: [id number]  Referring provider: Corbin Reddnig MD  Dx:   Encounter Diagnosis     ICD-10-CM    1  Right knee pain, unspecified chronicity  M25 561        Start Time: 1416  Stop Time: 1501  Total time in clinic (min): 45 minutes    Subjective: patient reports stiffness coming into therapy due to weather  Reports feeling better post last therapy session  Denies any increased pain or soreness since      Objective: See treatment diary below      Assessment: patient was able to complete therapy without reported increase to baseline  Reports improved stiffness  Was able to resume full complement and progress in therapy  Was able to introduce load with SLR abd without issue  Progress quad set to standing TKE to improved motor control  Was able to maintain SLS for 15" with mod ankle knee strategy  Introduced lateral stepping with cueing on sequencing along with corrective squatting mechanics during sit to stand      Plan: Continue per plan of care  Progress treatment as tolerated  Precautions: Umbilical hernia repair 6214 w/ mesh placement, h/o ankle instability, LBP, fibromyalgia    Manuals 3/8 3/10 3/15 3/17      R HS st, knee PROM TETO SM TETO TETO      Patellar PROM TETO SM TETO       STM    TETO                 Neuro Re-Ed 3/8 3/10 3/15 3/17      TKE    10"x10 RTB      Hurdles           SLS    10"x10                                    Ther Ex 3/8 3/10 3/15 3/17      Pt ed           Bike 6' 6' 5' 6'      TG squats          Lateral stepping          LAQ          HS curl w/ TB   GTB 2x10       Heel slide 5"x10 5"x10         Clamshells    RTB 3"2x10 RTB 3"2x10      SAQ          HR/TR          Quad set  5"x10 5"x10  10"x10       SLR Flexion 2x10 2x10  x10 2x10      SLR Abd 2x10 2x10  2x10 1# 2x10      Bridge 5'x10 5"x15 RTB 5"2x10 RTB 5" 2x10      Ther Activity 3/8 3/10 3/15 3/17      Step Ups 8"x10 8"x10 eccentric due to p! 8"x10      Step Downs          Lateral Band Walk    RTB x 3 laps      Sit to Stand Low mat x10 2x10 low mat with foam pad (pt c/o p!)  2x10 low mat +foam      Lateral Step Up 8"x10 8", 2x10         Gait Training                              Modalities

## 2022-03-22 ENCOUNTER — OFFICE VISIT (OUTPATIENT)
Dept: PHYSICAL THERAPY | Facility: CLINIC | Age: 52
End: 2022-03-22
Payer: COMMERCIAL

## 2022-03-22 DIAGNOSIS — M25.561 RIGHT KNEE PAIN, UNSPECIFIED CHRONICITY: Primary | ICD-10-CM

## 2022-03-22 PROCEDURE — 97112 NEUROMUSCULAR REEDUCATION: CPT

## 2022-03-22 PROCEDURE — 97530 THERAPEUTIC ACTIVITIES: CPT

## 2022-03-22 PROCEDURE — 97110 THERAPEUTIC EXERCISES: CPT

## 2022-03-22 NOTE — PROGRESS NOTES
Daily Note     Today's date: 3/22/2022  Patient name: Samir Richardson  : 1970  MRN: [id number]  Referring provider: Shade Donnelly MD  Dx:   Encounter Diagnosis     ICD-10-CM    1  Right knee pain, unspecified chronicity  M25 561        Start Time: 1234  Stop Time: 1311  Total time in clinic (min): 37 minutes    Subjective: patient reports stiffness coming into therapy  Reports increased activity yesterday performing house work and laundry  Reports less difficulty and pain performing ADL's       Objective: See treatment diary below      Assessment: patient reports improved stiffness throughout and post therapy session  Was able to make small progressions without associated pain  Patient was able to perform sit to stand to low mat demonstrating good control and only requiring small cue on corrective feet placement  Patient is reliant on min UE support with step ups and SLS  Was able to introduce standing hip 3 way performed BL to further challenged hip strengthening and stability  Plan: Continue per plan of care  Progress treatment as tolerated         Precautions: Umbilical hernia repair 0843 w/ mesh placement, h/o ankle instability, LBP, fibromyalgia    Manuals 3/8 3/10 3/15 3/17 3/22    R HS st, knee PROM TETO SM TETO TETO     Patellar PROM TETO SM TETO      STM    TETO               Neuro Re-Ed 3/8 3/10 3/15 3/17 3/22    TKE    10"x10 RTB GTB 10"x10    Hurdles          SLS    10"x10 15"-20"x 5    HR w/TKE     x20    Standing Hip 3 way     RTB x10 ea BL             Ther Ex 3/8 3/10 3/15 3/17 3/22    Pt ed          Bike 6' 6' 5' 6' L2 x5'    HS curl w/ TB   GTB 2x10      Heel slide 5"x10 5"x10        Clamshells    RTB 3"2x10 RTB 3"2x10     Quad set  5"x10 5"x10  10"x10      SLR Flexion 2x10 2x10  x10 2x10     SLR Abd 2x10 2x10  2x10 1# 2x10     Bridge 5'x10 5"x15 RTB 5"2x10 RTB 5" 2x10     Ther Activity 3/8 3/10 3/15 3/17 3/22    Step Ups 8"x10 8"x10 eccentric due to p!  8"x10 8" 2x10    Step [place] Lateral Band Walk    RTB x 3 laps RTB x 3 laps    Sit to Stand Low mat x10 2x10 low mat with foam pad (pt c/o p!)  2x10 low mat +foam x10 low mat +foam/x10 low mat    Lateral Step Up 8"x10 8", 2x10        Gait Training                           Modalities

## 2022-03-24 ENCOUNTER — OFFICE VISIT (OUTPATIENT)
Dept: PHYSICAL THERAPY | Facility: CLINIC | Age: 52
End: 2022-03-24
Payer: COMMERCIAL

## 2022-03-24 DIAGNOSIS — M25.561 RIGHT KNEE PAIN, UNSPECIFIED CHRONICITY: Primary | ICD-10-CM

## 2022-03-24 PROCEDURE — 97140 MANUAL THERAPY 1/> REGIONS: CPT

## 2022-03-24 PROCEDURE — 97110 THERAPEUTIC EXERCISES: CPT

## 2022-03-24 PROCEDURE — 97530 THERAPEUTIC ACTIVITIES: CPT

## 2022-03-24 PROCEDURE — 97112 NEUROMUSCULAR REEDUCATION: CPT

## 2022-03-24 NOTE — PROGRESS NOTES
Daily Note     Today's date: 3/24/2022  Patient name: Didier Bashir  : 1970  MRN: [id number]  Referring provider: Rafi Bran MD  Dx:   Encounter Diagnosis     ICD-10-CM    1  Right knee pain, unspecified chronicity  M25 561        Start Time: 1140  Stop Time: 1234  Total time in clinic (min): 54 minutes    Subjective: patient reports stiffness coming into therapy  Reports weather related  Denies any pain or soreness post last therapy session  Reports being able to go grocery shopping post session with out issue or difficulty      Objective: See treatment diary below      Assessment:  Patient continues to progress in therapy without onset of pain  FOTO administered scoring 47 improving for 29 at previous intake  Patient was able to perform fwd step up and introduce step downs demonstrating good control and without use of UE support  Was able to progress SLS performing cone reaching out of COG with CG for safety and cueing on sequencing  Plan: Continue per plan of care  Progress treatment as tolerated  Precautions: Umbilical hernia repair 2463 w/ mesh placement, h/o ankle instability, LBP, fibromyalgia    Manuals 3/8 3/10 3/15 3/17 3/22 3/24   R HS st, knee PROM TETO SM TETO TETO  TETO   Patellar PROM TETO SM TETO      STM    TETO               Neuro Re-Ed 3/8 3/10 3/15 3/17 3/22 3/24   TKE    10"x10 RTB GTB 10"x10 GTB 10"x10   Hurdles          SLS    10"x10 15"-20"x 5 20"x5   HR w/TKE     x20    Standing Hip 3 way     RTB x10 ea BL RTB x 10 ea BL   SLS Cone Reaching       To high mat   Ther Ex 3/8 3/10 3/15 3/17 3/22 3/24   Pt ed          Bike 6' 6' 5' 6' L2 x5'    HS curl w/ TB   GTB 2x10      Heel slide 5"x10 5"x10        Clamshells    RTB 3"2x10 RTB 3"2x10     FOTO      Administered 47   SLR Flexion 2x10 2x10  x10 2x10     SLR Abd 2x10 2x10  2x10 1# 2x10     Bridge 5'x10 5"x15 RTB 5"2x10 RTB 5" 2x10  5"x20   Ther Activity 3/8 3/10 3/15 3/17 3/22 3/24   Step Ups 8"x10 8"x10 eccentric due to p! 8"x10 8" 2x10 8"2x10   Step Downs      6"x10   Lateral Band Walk    RTB x 3 laps RTB x 3 laps GTB x 4 laps    Sit to Stand Low mat x10 2x10 low mat with foam pad (pt c/o p!)  2x10 low mat +foam x10 low mat +foam/x10 low mat 2x10   Lateral Step Up 8"x10 8", 2x10        Gait Training                           Modalities

## 2022-03-29 ENCOUNTER — OFFICE VISIT (OUTPATIENT)
Dept: PHYSICAL THERAPY | Facility: CLINIC | Age: 52
End: 2022-03-29
Payer: COMMERCIAL

## 2022-03-29 DIAGNOSIS — M25.561 RIGHT KNEE PAIN, UNSPECIFIED CHRONICITY: Primary | ICD-10-CM

## 2022-03-29 PROCEDURE — 97140 MANUAL THERAPY 1/> REGIONS: CPT

## 2022-03-29 PROCEDURE — 97112 NEUROMUSCULAR REEDUCATION: CPT

## 2022-03-29 PROCEDURE — 97110 THERAPEUTIC EXERCISES: CPT

## 2022-03-29 NOTE — PROGRESS NOTES
Daily Note     Today's date: 3/29/2022  Patient name: Aliya Nelson  : 1970  MRN: [id number]  Referring provider: Viridiana Villalobos MD  Dx:   Encounter Diagnosis     ICD-10-CM    1  Right knee pain, unspecified chronicity  M25 561        Start Time: 1143  Stop Time: 1228  Total time in clinic (min): 45 minutes    Subjective: patient reports increased soreness and stiffness coming into therapy  Reports weather related  Denies any new incidents  Reports overall improvement      Objective: See treatment diary below      Assessment:  Patient was limited in therapy due to reports  Was able to perform mat program, sit to stand, and SLS with fair to good tolerance  Required rest intervals between sets due to fatigue vs pain  Cueing with sit to stand to address L>R weight distribution  Plan: Continue per plan of care  Progress treatment as tolerated         Precautions: Umbilical hernia repair 0769 w/ mesh placement, h/o ankle instability, LBP, fibromyalgia    Manuals 3/29  3/15 3/17 3/22 3/24   R HS st, knee PROM TETO  TETO TETO  TETO   Patellar PROM Kalyn Martinez      STM    TETO               Neuro Re-Ed 3/29  3/15 3/17 3/22 3/24   TKE GTB 10"x10   10"x10 RTB GTB 10"x10 GTB 10"x10   Hurdles          SLS 20"x5   10"x10 15"-20"x 5 20"x5   HR w/TKE 3x10    x20    Standing Hip 3 way     RTB x10 ea BL RTB x 10 ea BL   SLS Cone Reaching       To high mat   Ther Ex 3/29  3/15 3/17 3/22 3/24   Pt ed          Bike L1x6 mins  5' 6' L2 x5'    HS curl w/ TB   GTB 2x10      Heel slide         Clamshells    RTB 3"2x10 RTB 3"2x10     FOTO      Administered 47   SLR Flexion 1# 3x10  x10 2x10     SLR Abd 1# 3x10  2x10 1# 2x10     Bridge GTB 5" 3x10  RTB 5"2x10 RTB 5" 2x10  5"x20   Ther Activity   3/15 3/17 3/22 3/24   Step Ups    8"x10 8" 2x10 8"2x10   Step Downs      6"x10   Lateral Band Walk    RTB x 3 laps RTB x 3 laps GTB x 4 laps    Sit to Stand Low x10   2x10 low mat +foam x10 low mat +foam/x10 low mat 2x10   Lateral Step Up Gait Training                           Modalities

## 2022-03-31 ENCOUNTER — OFFICE VISIT (OUTPATIENT)
Dept: PHYSICAL THERAPY | Facility: CLINIC | Age: 52
End: 2022-03-31
Payer: COMMERCIAL

## 2022-03-31 DIAGNOSIS — M25.561 RIGHT KNEE PAIN, UNSPECIFIED CHRONICITY: Primary | ICD-10-CM

## 2022-03-31 PROCEDURE — 97110 THERAPEUTIC EXERCISES: CPT

## 2022-03-31 PROCEDURE — 97530 THERAPEUTIC ACTIVITIES: CPT

## 2022-03-31 PROCEDURE — 97112 NEUROMUSCULAR REEDUCATION: CPT

## 2022-03-31 NOTE — PROGRESS NOTES
Daily Note     Today's date: 3/31/2022  Patient name: Neyda Woods  : 1970  MRN: [id number]  Referring provider: Mago Nolen MD  Dx:   Encounter Diagnosis     ICD-10-CM    1  Right knee pain, unspecified chronicity  M25 561        Start Time: 1230  Stop Time: 1318  Total time in clinic (min): 48 minutes    Subjective:   Patient reports stiffness and soreness coming into therapy  States weather related reporting full body      Objective: See treatment diary below      Assessment: patient reports improved stiffness post therapy session  Was able to complete without reported increase to baseline soreness  Is reliant on cues for exercise sequencing  Was able to progress step up to be performed on unstable surface requiring less UE support with repetition  Required corrective cues and CG with cone reaching being able to perform with little OP LE tapping         Plan: Continue per plan of care  Progress treatment as tolerated         Precautions: Umbilical hernia repair 8423 w/ mesh placement, h/o ankle instability, LBP, fibromyalgia    Manuals 3/29 3/31 3/15 3/17 3/22 3/24   R HS st, knee PROM TETO  TETO TETO  TETO   Patellar PROM Shazia Rodney      STM    TETO               Neuro Re-Ed 3/29 3/31 3/15 3/17 3/22 3/24   TKE GTB 10"x10 BTB 10"x10  10"x10 RTB GTB 10"x10 GTB 10"x10   Hurdles          SLS 20"x5 20"x5  10"x10 15"-20"x 5 20"x5   HR w/TKE 3x10    x20    Standing Hip 3 way     RTB x10 ea BL RTB x 10 ea BL   SLS Cone Reaching   To high mat x 3    To high mat   Ther Ex 3/29 3/31 3/15 3/17 3/22 3/24   Pt ed          Bike L1x6 mins L1x 6 mins 5' 6' L2 x5'    HS curl w/ TB   GTB 2x10      Heel slide         Clamshells    RTB 3"2x10 RTB 3"2x10     FOTO      Administered 47   SLR Flexion 1# 3x10 1# 3x10 x10 2x10     SLR Abd 1# 3x10  2x10 1# 2x10     Bridge GTB 5" 3x10 GTB 5"x20 RTB 5"2x10 RTB 5" 2x10  5"x20   Ther Activity   3/15 3/17 3/22 3/24   Step Ups  6"+foam x20  8"x10 8" 2x10 8"2x10   Step Downs      6"x10 Lateral Band Walk  GTB 2x10  RTB x 3 laps RTB x 3 laps GTB x 4 laps    Sit to Stand Low x10 Low mat 3x10  2x10 low mat +foam x10 low mat +foam/x10 low mat 2x10   Lateral Step Up         Gait Training                           Modalities

## 2022-04-05 ENCOUNTER — APPOINTMENT (OUTPATIENT)
Dept: PHYSICAL THERAPY | Facility: CLINIC | Age: 52
End: 2022-04-05
Payer: COMMERCIAL

## 2022-04-07 ENCOUNTER — OFFICE VISIT (OUTPATIENT)
Dept: PHYSICAL THERAPY | Facility: CLINIC | Age: 52
End: 2022-04-07
Payer: COMMERCIAL

## 2022-04-07 DIAGNOSIS — M25.561 RIGHT KNEE PAIN, UNSPECIFIED CHRONICITY: Primary | ICD-10-CM

## 2022-04-07 PROCEDURE — 97110 THERAPEUTIC EXERCISES: CPT

## 2022-04-07 NOTE — PROGRESS NOTES
Daily Note     Today's date: 2022  Patient name: Enid German  : 1970  MRN: [id number]  Referring provider: Aurora Vyas MD  Dx:   Encounter Diagnosis     ICD-10-CM    1  Right knee pain, unspecified chronicity  M25 561        Start Time: 1230  Stop Time: 1315  Total time in clinic (min): 45 minutes    Subjective: patient reports increased soreness and stiffness coming into therapy reporting weather related denying any new incidents or increased activity  Objective: See treatment diary below      Assessment: patient was able to complete therapy without reported increase to baseline symptoms  Reports improved stiffness post therapy when compared to beginning  Performed mat program LE stretching and strengthening with cues on sequencing and positioning of exercises  Required normal rest intervals due to fatigue    Plan: Continue per plan of care  Progress treatment as tolerated         Precautions: Umbilical hernia repair 9817 w/ mesh placement, h/o ankle instability, LBP, fibromyalgia    Manuals 3/29 3/31 4/7 3/17 3/22 3/24   R HS st, knee PROM TETO   TETO  TETO   Patellar PROM TETO        STM                   Neuro Re-Ed 3/29 3/31 4/7 3/17 3/22 3/24   TKE GTB 10"x10 BTB 10"x10  10"x10 RTB GTB 10"x10 GTB 10"x10   Hurdles          SLS 20"x5 20"x5  10"x10 15"-20"x 5 20"x5   HR w/TKE 3x10    x20    Standing Hip 3 way     RTB x10 ea BL RTB x 10 ea BL   SLS Cone Reaching   To high mat x 3    To high mat   Ther Ex 3/29 3/31 4/7 3/17 3/22 3/24   Hamstring Stretch w/strap   30"x3      Bike L1x6 mins L1x 6 mins L1 x 8 mins 6' L2 x5'    HS curl w/ TB         Heel slide   10"x10      Clamshells    GTB 3"3x10 RTB 3"2x10     Modified Emmett Stretch   30"x3      FOTO      Administered 47   SLR Flexion 1# 3x10 1# 3x10 1# 3x10 2x10     SLR Abd 1# 3x10  1# 3x10 1# 2x10     Bridge GTB 5" 3x10 GTB 5"x20 GTB  3x10 RTB 5" 2x10  5"x20   Ther Activity    3/17 3/22 3/24   Step Ups  6"+foam x20  8"x10 8" 2x10 8"2x10 Step Downs      6"x10   Lateral Band Walk  GTB 2x10  RTB x 3 laps RTB x 3 laps GTB x 4 laps    Sit to Stand Low x10 Low mat 3x10  2x10 low mat +foam x10 low mat +foam/x10 low mat 2x10   Lateral Step Up         Gait Training                           Modalities

## 2022-04-12 ENCOUNTER — OFFICE VISIT (OUTPATIENT)
Dept: PHYSICAL THERAPY | Facility: CLINIC | Age: 52
End: 2022-04-12
Payer: COMMERCIAL

## 2022-04-12 DIAGNOSIS — M25.561 RIGHT KNEE PAIN, UNSPECIFIED CHRONICITY: Primary | ICD-10-CM

## 2022-04-12 PROCEDURE — 97110 THERAPEUTIC EXERCISES: CPT | Performed by: PHYSICAL THERAPIST

## 2022-04-12 NOTE — LETTER
2022    MD GRACIELA Oliveira 1  [facility] [address] [facility] [address]    Patient: Rosalva Velasquez   YOB: 1970   Date of Visit: 2022     Encounter Diagnosis     ICD-10-CM    1  Right knee pain, unspecified chronicity  M25 561        Dear Dr Leonora De La Torre: Thank you for your recent referral of Rosalva Velasquez  Please review the attached evaluation summary from Tennille's recent visit  Please verify that you agree with the plan of care by signing the attached order  If you have any questions or concerns, please do not hesitate to call  I sincerely appreciate the opportunity to share in the care of one of your patients and hope to have another opportunity to work with you in the near future  Sincerely,    Jenny Strickland, PT      Referring Provider:      I certify that I have read the below Plan of Care and certify the need for these services furnished under this plan of treatment while under my care  MD GRACIELA Oliveira 1  [facility] , [address] [facility] [address]  Via Fax: [phone]          PT Re-Evaluation    Today's date: 2022  Patient name: Rosalva Velasquez  : 1970  MRN: [id number]  Referring provider: Boyd Barrett MD  Dx:   Encounter Diagnosis     ICD-10-CM    1  Right knee pain, unspecified chronicity  M25 561        Start Time: 1415  Stop Time: 1455  Total time in clinic (min): 40 minutes    Assessment  Assessment details: Patient is a 47 y o  female who carries the diagnosis of right knee pain and has completed 19 visits to date with improved FOTO score of 44 to 50 since initial evaluation   Patient demonstrates notable subjective/objective improvement since enrolling in PT to include improved quality of gait, LE strength, and pain, however continues to exhibit lingering deficits to include limited closed chain strength/stability, decreased walking tolerance, and pain that continues to impact tolerance/ability to perform ADLs and recreational activities  Patient will benefit from continued skilled PT intervention to address the aforementioned impairments, achieve goals, maximize function, and improve quality of life  Pt is in agreement with this plan  Impairments: abnormal coordination, abnormal or restricted ROM, activity intolerance, impaired balance, impaired physical strength and pain with function  Understanding of Dx/Px/POC: good   Prognosis: good    Goals  ST  Patient will report 25% decrease in pain in 4 weeks-progressing   2  Patient will demonstrate 25% improvement in ROM in 4 weeks MET  3  Patient will demonstrate 1/2 grade improvement in strength in 4 weeks MET    LT  Patient will be able to perform IADLS without restriction or pain by discharge-progressing  2  Patient will be independent in HEP by discharge-progressing  3   Patient will be able to return to recreational/work duties without restriction or pain by discharge-progressing      Plan  Plan details: Cont to tx per POC  Patient would benefit from: skilled physical therapy  Planned modality interventions: cryotherapy and thermotherapy: hydrocollator packs  Planned therapy interventions: IADL retraining, body mechanics training, flexibility, functional ROM exercises, home exercise program, neuromuscular re-education, manual therapy, postural training, strengthening, stretching, therapeutic activities, therapeutic exercise and joint mobilization  Frequency: 2x week  Duration in weeks: 4  Plan of Care beginning date: 2022  Plan of Care expiration date: 5/10/2022  Treatment plan discussed with: patient        Subjective Evaluation    History of Present Illness  Mechanism of injury: Pt states she is seeing good improvement in her right knee and pleased with progress so far and notes decreased pain, improved strength, and ability to perform more ADLs and regaining her independence  Pt feels she is about 50% improved since enrolling in therapy  Pt still reports pain/limitations with prolonged sitting, squatting, prolonged standing, stairs, however feels she is better able to complete these tasks over the past month  Pt is to follow up with MD on 22      Pain  Current pain ratin  At best pain ratin (not bad)  At worst pain rating: 10        Objective     Active Range of Motion     Right Knee   Flexion: 120 degrees with pain  Extension: 0 degrees     Strength/Myotome Testing     Right Knee   Flexion: 5  Extension: 5    Additional Strength Details  Pain with resisted knee extension    Hip flexion- 4+/5  Hip abd 4+/5    General Comments:      Knee Comments  (+) TTP med/lat joint line, patellar tendon  Gait: WNL     Elys (+)         Precautions: Umbilical hernia repair  w/ mesh placement, h/o ankle instability, LBP, fibromyalgia        POC expires 5/10/22        Manuals 3/29 3/31 4/7 4/12 3/22 3/24   R HS st, knee PROM TETO     TETO   Patellar PROM TETO        STM                   Neuro Re-Ed 3/29 3/31 4/7 4/12 3/22 3/24   TKE GTB 10"x10 BTB 10"x10    GTB 10"x10 GTB 10"x10   Hurdles          SLS 20"x5 20"x5   15"-20"x 5 20"x5   HR w/TKE 3x10    x20    Standing Hip 3 way     RTB x10 ea BL RTB x 10 ea BL   SLS Cone Reaching   To high mat x 3    To high mat   Ther Ex 3/29 3/31 4/7 4/12 3/22 3/24   Hamstring Stretch w/strap   30"x3 3x30"     Bike L1x6 mins L1x 6 mins L1 x 8 mins L1 x 8 min L2 x5'    HS curl w/ TB         Clamshells    GTB 3"3x10 GTB 3"2x10     Modified Emmett Stretch   30"x3 3x30"     FOTO    Re-assessed R knee and FOTO: updated PT prognosis/POC and goals   Administered 47   SLR Flexion 1# 3x10 1# 3x10 1# 3x10 2# 3x10     SLR Abd 1# 3x10  1# 3x10 2# 3x10     Bridge GTB 5" 3x10 GTB 5"x20 GTB  3x10 GTB 5" 2x10  5"x20   Ther Activity    4/12 3/22 3/24   Step Ups  6"+foam x20    8" 2x10 8"2x10   Step Downs      6"x10   Lateral Band Walk  GTB 2x10    RTB x 3 laps GTB x 4 laps    Sit to Stand Low x10 Low mat 3x10    x10 low mat +foam/x10 low mat 2x10   Lateral Step Up         Gait Training                           Modalities

## 2022-04-14 ENCOUNTER — OFFICE VISIT (OUTPATIENT)
Dept: PHYSICAL THERAPY | Facility: CLINIC | Age: 52
End: 2022-04-14
Payer: COMMERCIAL

## 2022-04-14 DIAGNOSIS — M25.561 RIGHT KNEE PAIN, UNSPECIFIED CHRONICITY: Primary | ICD-10-CM

## 2022-04-14 PROCEDURE — 97110 THERAPEUTIC EXERCISES: CPT | Performed by: PHYSICAL THERAPIST

## 2022-04-14 NOTE — PROGRESS NOTES
Daily Note     Today's date: 2022  Patient name: Meme Kwong  : 1970  MRN: [id number]  Referring provider: Rafa Benites MD  Dx:   Encounter Diagnosis     ICD-10-CM    1  Right knee pain, unspecified chronicity  M25 561                   Subjective: Pt reports no changes       Objective: See treatment diary below      Assessment: Tolerated treatment well  Patient demonstrated fatigue post treatment, exhibited good technique with therapeutic exercises and would benefit from continued PT  Pt required verbal cueing in order to complete exercise program this visit  Plan: Continue per plan of care  Progress treatment as tolerated         Precautions: Umbilical hernia repair 9616 w/ mesh placement, h/o ankle instability, LBP, fibromyalgia        POC expires 5/10/22        Manuals 3/29 3/31 4/7 4/12 4/14    R HS st, knee PROM TETO        Patellar PROM TETO        STM                   Neuro Re-Ed 3/29 3/31 4/7 4/12 4/14    TKE GTB 10"x10 BTB 10"x10        Hurdles          SLS 20"x5 20"x5       HR w/TKE 3x10        Standing Hip 3 way         SLS Cone Reaching   To high mat x 3       Ther Ex 3/29 3/31 4/7 4/12 4/14    Hamstring Stretch w/strap   30"x3 3x30" 3x30"    Bike L1x6 mins L1x 6 mins L1 x 8 mins L1 x 8 min L1 x 8 min    HS curl w/ TB         Clamshells    GTB 3"3x10 GTB 3"2x10 GTB 3"2x10    Modified Emmett Stretch   30"x3 3x30" 3x30"    FOTO    Re-assessed R knee and FOTO: updated PT prognosis/POC and goals      SLR Flexion 1# 3x10 1# 3x10 1# 3x10 2# 3x10 2# 3x10    SLR Abd 1# 3x10  1# 3x10 2# 3x10 2# 3x10    Bridge GTB 5" 3x10 GTB 5"x20 GTB  3x10 GTB 5" 2x10 GTB 5" 2x10    Ther Activity         Step Ups  6"+foam x20        Step Downs         Lateral Band Walk  GTB 2x10        Sit to Stand Low x10 Low mat 3x10        Lateral Step Up         Gait Training                           Modalities

## 2022-04-19 ENCOUNTER — APPOINTMENT (OUTPATIENT)
Dept: PHYSICAL THERAPY | Facility: CLINIC | Age: 52
End: 2022-04-19
Payer: COMMERCIAL

## 2022-04-21 ENCOUNTER — OFFICE VISIT (OUTPATIENT)
Dept: PHYSICAL THERAPY | Facility: CLINIC | Age: 52
End: 2022-04-21
Payer: COMMERCIAL

## 2022-04-21 DIAGNOSIS — M25.561 RIGHT KNEE PAIN, UNSPECIFIED CHRONICITY: Primary | ICD-10-CM

## 2022-04-21 PROCEDURE — 97110 THERAPEUTIC EXERCISES: CPT | Performed by: PHYSICAL THERAPIST

## 2022-04-21 NOTE — PROGRESS NOTES
Daily Note     Today's date: 2022  Patient name: Lilibeth Chester  : 1970  MRN: [id number]  Referring provider: Walter Thomas MD  Dx:   Encounter Diagnosis     ICD-10-CM    1  Right knee pain, unspecified chronicity  M25 561                   Subjective: Pt reports that she was doing good after last session but then the next day she wore heels and was in a lot of pain after wearing the heels  Objective: See treatment diary below      Assessment: Pt was instructed about proper footware and was instructed to not wear heels at this point in time to due to poor biomechanics that are utilized while wearing heels  Tolerated treatment well  Patient demonstrated fatigue post treatment, exhibited good technique with therapeutic exercises and would benefit from continued PT  Progressed exercises this visit  As she continues to demonstrate hip abductor weakness  She does tend to compensate with her hip flexor and requires cueing in order to inhibit hip flexor  Plan: Continue per plan of care  Progress treatment as tolerated         Precautions: Umbilical hernia repair 5691 w/ mesh placement, h/o ankle instability, LBP, fibromyalgia        POC expires 5/10/22     5/10/22   Manuals 3/29 3/31 4/7 4/12 4/14 4/19   R HS st, knee PROM TETO        Patellar PROM TETO        STM                   Neuro Re-Ed 3/29 3/31 4/7 4/12 4/14    TKE GTB 10"x10 BTB 10"x10        Hurdles          SLS 20"x5 20"x5       HR w/TKE 3x10        Standing Hip 3 way         SLS Cone Reaching   To high mat x 3       Ther Ex 3/29 3/31 4/7 4/12 4/14    Hamstring Stretch w/strap   30"x3 3x30" 3x30" 3x30"   Bike L1x6 mins L1x 6 mins L1 x 8 mins L1 x 8 min L1 x 8 min L1 x 8 min   HS curl w/ TB         Clamshells    GTB 3"3x10 GTB 3"2x10 GTB 3"2x10 GTB 3"3x10   Modified Emmett Stretch   30"x3 3x30" 3x30" 3x30"   FOTO    Re-assessed R knee and FOTO: updated PT prognosis/POC and goals      SLR Flexion 1# 3x10 1# 3x10 1# 3x10 2# 3x10 2# 3x10 2# 3x10   SLR Abd 1# 3x10  1# 3x10 2# 3x10 2# 3x10 2# 3x10   Bridge GTB 5" 3x10 GTB 5"x20 GTB  3x10 GTB 5" 2x10 GTB 5" 2x10 GTB 5" 3x10   Ther Activity    4/12     Step Ups  6"+foam x20        Step Downs         Lateral Band Walk  GTB 2x10        Sit to Stand Low x10 Low mat 3x10        Lateral Step Up         Gait Training                           Modalities

## 2022-04-26 ENCOUNTER — OFFICE VISIT (OUTPATIENT)
Dept: PHYSICAL THERAPY | Facility: CLINIC | Age: 52
End: 2022-04-26
Payer: COMMERCIAL

## 2022-04-26 DIAGNOSIS — M25.561 RIGHT KNEE PAIN, UNSPECIFIED CHRONICITY: Primary | ICD-10-CM

## 2022-04-26 PROCEDURE — 97112 NEUROMUSCULAR REEDUCATION: CPT

## 2022-04-26 PROCEDURE — 97110 THERAPEUTIC EXERCISES: CPT

## 2022-04-26 NOTE — PROGRESS NOTES
Daily Note     Today's date: 2022  Patient name: Adela Armstrong  : 1970  MRN: [id number]  Referring provider: Jose Farr MD  Dx:   Encounter Diagnosis     ICD-10-CM    1  Right knee pain, unspecified chronicity  M25 561        Start Time: 1228  Stop Time: 1315  Total time in clinic (min): 47 minutes    Subjective: patient reports feeling more achy due to weather  Report non painfull crepitus  Reports having follow up with  May 5th       Objective: See treatment diary below      Assessment:  Patient was able to complete therapy without incident on increase to baseline  Was able to resume SLS activities and motor control including standing hip 3 way, SLS, and cone reaching  Cueing on sequencing, mechanics, and rep/hold counts required      Plan: Continue per plan of care  Progress treatment as tolerated         Precautions: Umbilical hernia repair 3162 w/ mesh placement, h/o ankle instability, LBP, fibromyalgia        POC expires 5/10/22     5/10/22   Manuals 4/26 3/31 4/7 4/12 4/14 4/19   R HS st, knee PROM         Patellar PROM         STM                   Neuro Re-Ed 4/26 3/31 4/7 4/12 4/14    TKE BTB 10"x10 BTB 10"x10        Hurdles          SLS 20"x3 20"x5       HR w/TKE         Standing Hip 3 way GTB x10 ea        SLS Cone Reaching  To high mat x 3  To high mat x 3       Ther Ex  3/31 4/7 4/12 4/14    Hamstring Stretch w/strap 30"x3  30"x3 3x30" 3x30" 3x30"   Bike L3 x 8 mins L1x 6 mins L1 x 8 mins L1 x 8 min L1 x 8 min L1 x 8 min   HS curl w/ TB         Clamshells    GTB 3"3x10 GTB 3"2x10 GTB 3"2x10 GTB 3"3x10   Modified Emmett Stretch 30"x3  30"x3 3x30" 3x30" 3x30"   FOTO    Re-assessed R knee and FOTO: updated PT prognosis/POC and goals      SLR Flexion  1# 3x10 1# 3x10 2# 3x10 2# 3x10 2# 3x10   SLR Abd   1# 3x10 2# 3x10 2# 3x10 2# 3x10   Bridge GTB 5"3x10 GTB 5"x20 GTB  3x10 GTB 5" 2x10 GTB 5" 2x10 GTB 5" 3x10   Ther Activity      Step Ups  6"+foam x20        Step [employer] Lateral Band Walk  GTB 2x10        Sit to Stand  Low mat 3x10        Lateral Step Up         Gait Training                           Modalities

## 2022-04-28 ENCOUNTER — OFFICE VISIT (OUTPATIENT)
Dept: PHYSICAL THERAPY | Facility: CLINIC | Age: 52
End: 2022-04-28
Payer: COMMERCIAL

## 2022-04-28 DIAGNOSIS — M25.561 RIGHT KNEE PAIN, UNSPECIFIED CHRONICITY: Primary | ICD-10-CM

## 2022-04-28 PROCEDURE — 97530 THERAPEUTIC ACTIVITIES: CPT

## 2022-04-28 PROCEDURE — 97110 THERAPEUTIC EXERCISES: CPT

## 2022-04-28 PROCEDURE — 97112 NEUROMUSCULAR REEDUCATION: CPT

## 2022-04-28 NOTE — PROGRESS NOTES
Daily Note     Today's date: 2022  Patient name: Meme Kwong  : 1970  MRN: [id number]  Referring provider: Rafa Benites MD  Dx:   Encounter Diagnosis     ICD-10-CM    1  Right knee pain, unspecified chronicity  M25 561                   Subjective:  Patient reports that her knee is achey this date  Notes that both the inside and outside of both her knees is tender  Objective: See treatment diary below      Assessment: Tolerated treatment well this date  Progressed patient through standing activity and resistance exercises to tolerance focusing on improving hip motor control and LE strength  Patient shows good ability to maintain proper form through most exercises although with increased fatigue some compensations seen  With cueing, patient able to self correct  Progress as able  Plan: Continue per plan of care  Progress treatment as tolerated         Precautions: Umbilical hernia repair 4311 w/ mesh placement, h/o ankle instability, LBP, fibromyalgia        POC expires 5/10/22 5/10/22    5/10/22   Manuals 22   R HS st, knee PROM         Patellar PROM         STM                   Neuro Re-Ed     TKE BTB 10"x10 8 5# 20x2       Hurdles          SLS 20"x3        HR w/TKE         Standing Hip 3 way GTB x10 ea        Bent over extensions   2x12       SLS Cone Reaching  To high mat x 3         Ther Ex         Hamstring Stretch w/strap 30"x3 4x30"   3x30" 3x30"   Bike L3 x 8 mins 6'    L1 x 8 min L1 x 8 min   HS curl w/ TB         Clamshells      GTB 3"2x10 GTB 3"3x10   Modified Emmett Stretch 30"x3 4x30"   3x30" 3x30"   FOTO                  SLR Flexion     2# 3x10 2# 3x10   Leg press  3x10 95#       SLR Abd     2# 3x10 2# 3x10   Bridge GTB 5"3x10    GTB 5" 2x10 GTB 5" 3x10   Ther Activity         Step Ups         Step Downs         Monster walks   4 laps rtb        Lateral Band Walk         Sit to Stand         Side steps   4x rtb @ rail Lateral Step Up         Gait Training                           Modalities

## 2022-06-09 ENCOUNTER — OFFICE VISIT (OUTPATIENT)
Dept: NEUROLOGY | Facility: CLINIC | Age: 52
End: 2022-06-09
Payer: COMMERCIAL

## 2022-06-09 VITALS
HEIGHT: 68 IN | TEMPERATURE: 96.4 F | DIASTOLIC BLOOD PRESSURE: 90 MMHG | HEART RATE: 94 BPM | SYSTOLIC BLOOD PRESSURE: 148 MMHG | WEIGHT: 268 LBS | BODY MASS INDEX: 40.62 KG/M2 | OXYGEN SATURATION: 95 %

## 2022-06-09 DIAGNOSIS — Z86.16 HISTORY OF COVID-19: ICD-10-CM

## 2022-06-09 DIAGNOSIS — R25.1 TREMOR, UNSPECIFIED: Primary | ICD-10-CM

## 2022-06-09 PROCEDURE — 99214 OFFICE O/P EST MOD 30 MIN: CPT | Performed by: PSYCHIATRY & NEUROLOGY

## 2022-06-09 NOTE — PROGRESS NOTES
Lilibeth Chester is a 47 y o  female  Chief Complaint   Patient presents with    Tremor, unspecified,       Assessment:  1  Tremor, unspecified    2  History of COVID-19        Plan:  Follow-up in 6 months  Discussion:  Patient is doing well, tremor seems to be under control, she is in process of following up with an ophthalmologist denies having any headaches or vision difficulties, she was advised to keep a blood pressure cholesterol and sugar under control, to go to the hospital if has any worsening symptoms and call me otherwise to see me back in 6 months and follow up with other physicians  Subjective:    HPI   Patient is here in follow-up for tremor, since her last visit she is doing good she describes her brain fog is resolved she is able to concentrate more and she feels her tremor has resolved, no headaches no vision difficulties no speech difficulties she is in follow up with an ophthalmologist and was told to see a neuro ophthalmologist, currently she is denying having any vision difficulties, do not have her ophthalmologist records and she is not see me for that issue and is seeing me only for tremor, denies having any hallucinations, no memory difficulty her appetite is good weight has been stable no other complaints      Vitals:    06/09/22 1356   BP: 148/90   BP Location: Right arm   Patient Position: Sitting   Cuff Size: Adult   Pulse: 94   Temp: (!) 96 4 °F (35 8 °C)   TempSrc: Temporal   SpO2: 95%   Height: 5' 8" (1 727 m)       Current Medications    Current Outpatient Medications:     albuterol (PROVENTIL HFA,VENTOLIN HFA) 90 mcg/act inhaler, Inhale 2 puffs every 4 (four) hours as needed , Disp: , Rfl:     busPIRone (BUSPAR) 30 MG tablet, Take 1 tablet by mouth 2 (two) times a day  , Disp: , Rfl:     COLLAGEN PO, Take 24,000 mg by mouth daily, Disp: , Rfl:     diclofenac sodium (VOLTAREN) 1 %, 3 (three) times a day, Disp: , Rfl:     DULoxetine (CYMBALTA) 60 mg delayed release capsule, Take 1 capsule by mouth 2 (two) times a day, Disp: , Rfl:     ergocalciferol (VITAMIN D2) 50,000 units, 2 (two) times a week, Disp: , Rfl:     estradiol (VIVELLE-DOT) 0 0375 MG/24HR, Place 1 patch on the skin 2 (two) times a week, Disp: , Rfl:     Ferrous Sulfate (IRON) 325 (65 Fe) MG TABS, Take 1 tablet by mouth daily, Disp: , Rfl:     LYRICA 300 MG capsule, 2 (two) times a day, Disp: , Rfl:     montelukast (SINGULAIR) 10 mg tablet, Take 1 tablet by mouth daily at bedtime , Disp: , Rfl:     Multiple Vitamin (MULTIVITAMIN) capsule, Take by mouth, Disp: , Rfl:     Omega-3 Fatty Acids (FISH OIL) 1000 MG CPDR, Take by mouth , Disp: , Rfl:     risperiDONE (RisperDAL) 0 5 mg tablet, Take 0 5 mg by mouth daily at bedtime, Disp: , Rfl:     sertraline (ZOLOFT) 100 mg tablet, Take 100 mg by mouth 2 (two) times a day, Disp: , Rfl:     traMADol (ULTRAM) 50 mg tablet, Take 50 mg by mouth every 8 (eight) hours as needed for moderate pain, Disp: , Rfl:     TURMERIC PO, Take 1,500 mg by mouth 3 (three) times a day, Disp: , Rfl:     zolpidem (AMBIEN) 10 mg tablet, Take 1 tablet by mouth daily at bedtime as needed, Disp: , Rfl:     baclofen 10 mg tablet, Take 10 mg by mouth Three times daily as needed, Disp: , Rfl:     fluticasone (FLONASE) 50 mcg/act nasal spray, 2 sprays into each nostril daily as needed , Disp: , Rfl:     hydrOXYzine pamoate (VISTARIL) 50 mg capsule, daily at bedtime, Disp: , Rfl:     ketoconazole (NIZORAL) 2 % cream, as needed  (Patient not taking: Reported on 12/9/2021 ), Disp: , Rfl:     ketoconazole (NIZORAL) 2 % shampoo, as needed , Disp: , Rfl:     mirtazapine (REMERON SOL-TAB) 45 mg dispersible tablet, Take 45 mg by mouth daily at bedtime , Disp: , Rfl:     pantoprazole (PROTONIX) 40 mg tablet, Take 40 mg by mouth as needed , Disp: , Rfl:     solifenacin (VESICARE) 5 mg tablet, Take 5 mg by mouth daily (Patient not taking: Reported on 6/9/2022), Disp: , Rfl: Allergies  Aspirin, Shellfish allergy - food allergy, Chlorhexidine, Cinnamon - food allergy, and Latex    Past Medical History  Past Medical History:   Diagnosis Date    Breast cancer ([facility] ) 2014    Eardrum rupture, left     Fibromyalgia, primary     Ovarian CA, unspecified laterality ([facility] )     surgery scheduled 06-03-19    Seasonal allergies          Past Surgical History:  Past Surgical History:   Procedure Laterality Date    BREAST LUMPECTOMY  2015    HYSTERECTOMY Bilateral 09/16/2019    MYOMECTOMY      UMBILICAL HERNIA REPAIR  09/29/2020         Family History:  Family History   Problem Relation Age of Onset    Parkinsonism Mother     Rheum arthritis Mother     Osteoarthritis Mother     Heart attack Mother     Diabetes Mother     Depression Mother     Stroke Mother     Hypertension Father     Heart murmur Father     Depression Father     Diabetes Sister     Diabetes Brother     Tremor Maternal Grandmother        Social History:   reports that she has never smoked  She has never used smokeless tobacco  She reports that she does not drink alcohol and does not use drugs  I have reviewed the past medical history, surgical history, social and family history, current medications, allergies vitals, review of systems, and updated this information as appropriate today  Objective:    Physical Exam    Neurological Exam    GENERAL:  Cooperative in no acute distress  Well-developed and well-nourished    HEAD and NECK   Head is atraumatic normocephalic with no lesions or masses  Neck is supple with full range of motion    CARDIOVASCULAR  Carotid Arteries-no carotid bruits  NEUROLOGIC:  Mental Status-the patient is awake alert and oriented without aphasia or apraxia  Cranial Nerves: Visual fields are full to confrontation  Visual acuity is 20/20 with hand-held chart Extraocular movements are full without nystagmus  Pupils are 2-1/2 mm and reactive  Face is symmetrical to light touch  Movements of facial expression move symmetrically  Hearing is normal to finger rub bilaterally  Soft palate lifts symmetrically  Shoulder shrug is symmetrical  Tongue is midline without atrophy  Motor: No drift is noted on arm extension  Strength is full in the upper and lower extremities with normal bulk and tone  No tremor noted on outstretched hands  Coordination: Finger to nose testing is performed accurately  Romberg is negative  Gait reveals a normal base with symmetrical arm swing  ROS:  Review of Systems   Constitutional: Negative  Negative for appetite change and fever  HENT: Negative  Negative for hearing loss, tinnitus, trouble swallowing and voice change  Eyes: Negative  Negative for photophobia and pain  Respiratory: Negative  Negative for shortness of breath  Cardiovascular: Negative  Negative for palpitations  Gastrointestinal: Negative  Negative for nausea and vomiting  Endocrine: Negative  Negative for cold intolerance  Genitourinary: Negative  Negative for dysuria, frequency and urgency  Musculoskeletal: Negative  Negative for myalgias and neck pain  Skin: Negative  Negative for rash  Neurological: Positive for headaches  Negative for dizziness, tremors, seizures, syncope, facial asymmetry, speech difficulty, weakness, light-headedness and numbness  Hematological: Bruises/bleeds easily  Psychiatric/Behavioral: Negative  Negative for confusion, hallucinations and sleep disturbance

## 2022-10-18 ENCOUNTER — OFFICE VISIT (OUTPATIENT)
Dept: NEUROLOGY | Facility: CLINIC | Age: 52
End: 2022-10-18
Payer: COMMERCIAL

## 2022-10-18 VITALS
SYSTOLIC BLOOD PRESSURE: 130 MMHG | OXYGEN SATURATION: 98 % | HEIGHT: 68 IN | DIASTOLIC BLOOD PRESSURE: 72 MMHG | HEART RATE: 78 BPM | TEMPERATURE: 99.1 F | BODY MASS INDEX: 40.75 KG/M2

## 2022-10-18 DIAGNOSIS — Z86.16 HISTORY OF COVID-19: ICD-10-CM

## 2022-10-18 DIAGNOSIS — R42 DIZZINESS AND GIDDINESS: Primary | ICD-10-CM

## 2022-10-18 DIAGNOSIS — R25.1 TREMOR, UNSPECIFIED: ICD-10-CM

## 2022-10-18 PROCEDURE — 99214 OFFICE O/P EST MOD 30 MIN: CPT | Performed by: PSYCHIATRY & NEUROLOGY

## 2022-10-18 NOTE — PROGRESS NOTES
Aliya Nelson is a 47 y o  female  Chief Complaint   Patient presents with   • Tremor, unspecified       Assessment:  1  Dizziness and giddiness    2  Tremor, unspecified    3  History of COVID-19        Plan:  Referral to ENT  Fall and safety precautions  Follow-up in 6 months  Discussion:  Patient is doing well her tremor seems to be under control, her dizziness could most likely be vestibular in etiology, advised the patient to see an ENT surgeon especially given that she had mild sinus disease on the MRI scan of the brain in the past and I had advised her to follow up with family physician and an ENT she never sign ENT, she has been advised to go for vestibular therapy by her family physician, she was advised to keep her blood pressure cholesterol sugar under control, to take fall and safety precautions, to go to the hospital if has any worsening symptoms and call me otherwise to see me back in 6 months and follow up with her other physicians  Subjective:    HPI   Patient is here in follow-up for tremor, since her last visit she tells me that her tremor is much better she has been having dizziness for the last 2-3 months which is mostly positional in nature especially when she turns to the left side and she does have some tinnitus in the left ear she saw her family physician and has been advised to go for vestibular therapy she denies having any headaches no vision difficulty no speech difficulty no motor or sensory symptoms in upper or lower extremities, she has not had any falls, no confusion, appetite is good weight has been stable no other complaints      Vitals:    10/18/22 1304   BP: 128/70   BP Location: Right arm   Patient Position: Sitting   Cuff Size: Adult   Pulse: 78   Temp: 99 1 °F (37 3 °C)   TempSrc: Probe   SpO2: 98%   Height: 5' 8" (1 727 m)       Current Medications    Current Outpatient Medications:   •  albuterol (PROVENTIL HFA,VENTOLIN HFA) 90 mcg/act inhaler, Inhale 2 puffs every 4 (four) hours as needed , Disp: , Rfl:   •  busPIRone (BUSPAR) 30 MG tablet, Take 1 tablet by mouth 2 (two) times a day  , Disp: , Rfl:   •  COLLAGEN PO, Take 24,000 mg by mouth daily, Disp: , Rfl:   •  diclofenac sodium (VOLTAREN) 1 %, 3 (three) times a day, Disp: , Rfl:   •  DULoxetine (CYMBALTA) 60 mg delayed release capsule, Take 1 capsule by mouth 2 (two) times a day, Disp: , Rfl:   •  ergocalciferol (VITAMIN D2) 50,000 units, 2 (two) times a week, Disp: , Rfl:   •  Ferrous Sulfate (IRON) 325 (65 Fe) MG TABS, Take 1 tablet by mouth daily, Disp: , Rfl:   •  fluticasone (FLONASE) 50 mcg/act nasal spray, 2 sprays into each nostril daily as needed , Disp: , Rfl:   •  LYRICA 300 MG capsule, 2 (two) times a day, Disp: , Rfl:   •  montelukast (SINGULAIR) 10 mg tablet, Take 1 tablet by mouth daily at bedtime , Disp: , Rfl:   •  Multiple Vitamin (MULTIVITAMIN) capsule, Take by mouth, Disp: , Rfl:   •  Omega-3 Fatty Acids (FISH OIL) 1000 MG CPDR, Take by mouth , Disp: , Rfl:   •  pantoprazole (PROTONIX) 40 mg tablet, Take 40 mg by mouth as needed , Disp: , Rfl:   •  sertraline (ZOLOFT) 100 mg tablet, Take 100 mg by mouth 2 (two) times a day, Disp: , Rfl:   •  traMADol (ULTRAM) 50 mg tablet, Take 50 mg by mouth every 8 (eight) hours as needed for moderate pain, Disp: , Rfl:   •  TURMERIC PO, Take 1,500 mg by mouth 3 (three) times a day, Disp: , Rfl:   •  zolpidem (AMBIEN) 10 mg tablet, Take 1 tablet by mouth daily at bedtime as needed, Disp: , Rfl:   •  baclofen 10 mg tablet, Take 10 mg by mouth Three times daily as needed, Disp: , Rfl:   •  estradiol (VIVELLE-DOT) 0 0375 MG/24HR, Place 1 patch on the skin 2 (two) times a week (Patient not taking: Reported on 10/18/2022), Disp: , Rfl:   •  hydrOXYzine pamoate (VISTARIL) 50 mg capsule, daily at bedtime, Disp: , Rfl:   •  ketoconazole (NIZORAL) 2 % cream, as needed  (Patient not taking: Reported on 12/9/2021 ), Disp: , Rfl:   •  ketoconazole (NIZORAL) 2 % shampoo, as needed , Disp: , Rfl:   •  mirtazapine (REMERON SOL-TAB) 45 mg dispersible tablet, Take 45 mg by mouth daily at bedtime  (Patient not taking: Reported on 10/18/2022), Disp: , Rfl:   •  risperiDONE (RisperDAL) 0 5 mg tablet, Take 0 5 mg by mouth daily at bedtime (Patient not taking: Reported on 10/18/2022), Disp: , Rfl:   •  solifenacin (VESICARE) 5 mg tablet, Take 5 mg by mouth daily (Patient not taking: No sig reported), Disp: , Rfl:       Allergies  Aspirin, Shellfish allergy - food allergy, Chlorhexidine, Cinnamon - food allergy, and Latex    Past Medical History  Past Medical History:   Diagnosis Date   • Breast cancer ([facility] ) 2014   • Eardrum rupture, left    • Fibromyalgia, primary    • Ovarian CA, unspecified laterality ([facility] )     surgery scheduled 06-03-19   • Seasonal allergies          Past Surgical History:  Past Surgical History:   Procedure Laterality Date   • BREAST LUMPECTOMY  2015   • HYSTERECTOMY Bilateral 09/16/2019   • MYOMECTOMY     • UMBILICAL HERNIA REPAIR  09/29/2020         Family History:  Family History   Problem Relation Age of Onset   • Parkinsonism Mother    • Rheum arthritis Mother    • Osteoarthritis Mother    • Heart attack Mother    • Diabetes Mother    • Depression Mother    • Stroke Mother    • Hypertension Father    • Heart murmur Father    • Depression Father    • Diabetes Sister    • Diabetes Brother    • Tremor Maternal Grandmother        Social History:   reports that she has never smoked  She has never used smokeless tobacco  She reports that she does not drink alcohol and does not use drugs  I have reviewed the past medical history, surgical history, social and family history, current medications, allergies vitals, review of systems, and updated this information as appropriate today  Objective:    Physical Exam    Neurological Exam    GENERAL:  Cooperative in no acute distress   Well-developed and well-nourished    HEAD and NECK   Head is atraumatic normocephalic with no lesions or masses  Neck is supple with full range of motion    CARDIOVASCULAR  Carotid Arteries-no carotid bruits  NEUROLOGIC:  Mental Status-the patient is awake alert and oriented without aphasia or apraxia  Cranial Nerves: Visual fields are full to confrontation  Visual acuity is 20/20 with hand-held chart Extraocular movements are full without nystagmus  Pupils are 2-1/2 mm and reactive  Face is symmetrical to light touch  Movements of facial expression move symmetrically  Hearing is normal to finger rub bilaterally  Soft palate lifts symmetrically  Shoulder shrug is symmetrical  Tongue is midline without atrophy  Motor: No drift is noted on arm extension  Strength is full in the upper and lower extremities with normal bulk and tone  Sensory: Intact to temperature and vibratory sensation in the upper and lower extremities bilaterally  Cortical function is intact  Coordination: Finger to nose testing is performed accurately  Romberg is negative  Gait reveals a normal base with symmetrical arm swing  Tandem walk is normal   Reflexes:    2+ and symmetrical          ROS:  Review of Systems   Constitutional: Negative  Negative for appetite change and fever  HENT: Negative  Negative for hearing loss, tinnitus, trouble swallowing and voice change  Eyes: Negative  Negative for photophobia, pain and visual disturbance  Respiratory: Negative  Negative for shortness of breath  Cardiovascular: Negative  Negative for palpitations  Gastrointestinal: Negative  Negative for nausea and vomiting  Endocrine: Negative  Negative for cold intolerance  Genitourinary: Positive for frequency and urgency  Negative for dysuria  Musculoskeletal: Negative  Negative for gait problem, myalgias and neck pain  Skin: Negative  Negative for rash  Allergic/Immunologic: Negative  Neurological: Positive for light-headedness   Negative for dizziness, tremors, seizures, syncope, facial asymmetry, speech difficulty, weakness, numbness and headaches  Hematological: Bruises/bleeds easily  Psychiatric/Behavioral: Negative  Negative for confusion, hallucinations and sleep disturbance

## 2024-01-12 ENCOUNTER — TELEPHONE (OUTPATIENT)
Dept: NEUROLOGY | Facility: CLINIC | Age: 54
End: 2024-01-12

## 2024-01-16 ENCOUNTER — TELEMEDICINE (OUTPATIENT)
Dept: NEUROLOGY | Facility: CLINIC | Age: 54
End: 2024-01-16
Payer: COMMERCIAL

## 2024-01-16 VITALS — BODY MASS INDEX: 37.23 KG/M2 | WEIGHT: 263.2 LBS

## 2024-01-16 DIAGNOSIS — R25.1 TREMOR, UNSPECIFIED: ICD-10-CM

## 2024-01-16 DIAGNOSIS — R42 DIZZINESS AND GIDDINESS: Primary | ICD-10-CM

## 2024-01-16 PROCEDURE — 99213 OFFICE O/P EST LOW 20 MIN: CPT | Performed by: PSYCHIATRY & NEUROLOGY

## 2024-01-16 RX ORDER — AMOXICILLIN 875 MG/1
875 TABLET, COATED ORAL 2 TIMES DAILY
COMMUNITY
Start: 2023-12-28

## 2024-01-16 RX ORDER — DULAGLUTIDE 0.75 MG/.5ML
0.75 INJECTION, SOLUTION SUBCUTANEOUS
COMMUNITY
Start: 2023-12-07

## 2024-01-16 RX ORDER — BUPROPION HYDROCHLORIDE 300 MG/1
1 TABLET ORAL EVERY MORNING
COMMUNITY
Start: 2023-08-09

## 2024-01-16 NOTE — PROGRESS NOTES
Virtual Regular Visit    Verification of patient location:    Patient is located at Home in the following state in which I hold an active license [place]      Assessment/Plan:  1. Dizziness and giddiness    2. Tremor, unspecified         Patient tells me that she is doing good her dizziness has resolved and her tremor is manageable, she has minimal intentional tremor especially when she is anxious, she is not keen to go on any medications she is currently recovering from a viral infection and is on antibiotics she was advised to continue follow-up with her other physicians including her psychiatrist and to keep her blood pressure cholesterol weight and sugar under control to take fall and safety precautions to keep yourself well-hydrated to go to the hospital if has any worsening symptoms and call me otherwise to see me back in 6 months or sooner if needed and follow-up with her other physicians  Problem List Items Addressed This Visit    None           Reason for visit is   Chief Complaint   Patient presents with    Virtual Regular Visit          Encounter provider Ellen Villar MD    Provider located at [facility]  [facility]  [address] [facility] [id number]      Recent Visits  Date Type Provider Dept   01/12/24 Telephone Tori Zamora  [facility]   Showing recent visits within past 7 days and meeting all other requirements  Today's Visits  Date Type Provider Dept   01/16/24 Telemedicine Ellen Villar MD [facility]   Showing today's visits and meeting all other requirements  Future Appointments  No visits were found meeting these conditions.  Showing future appointments within next 150 days and meeting all other requirements       The patient was identified by name and date of birth. Tennille Abad was informed that this is a telemedicine visit and that the visit is being conducted through the Epic  Embedded platform. She agrees to proceed..  My office door was closed. No one else was in the room.  She acknowledged consent and understanding of privacy and security of the video platform. The patient has agreed to participate and understands they can discontinue the visit at any time.    Patient is aware this is a billable service.     Cierra Abad is a 53 y.o. female  .  In follow-up for tremor, since her last visit she tells me that overall she is doing much better her tremor is very minimal and it is not bothersome and happens mostly when she is anxious or under stress she denies any resting tremor no early morning stiffness no shuffling gait no difficulty in swallowing her dizziness has completely resolved it was positional in nature, she recently had a cold and is currently recovering from that and is on antibiotics, she has not had any falls no confusion memory is good she is trying to lose weight, no other complaints.          Past Medical History:   Diagnosis Date    Breast cancer ([facility] 2014    Eardrum rupture, left     Fibromyalgia, primary     Ovarian CA, unspecified laterality ([facility])     surgery scheduled 06-03-19    Seasonal allergies     Type 2 diabetes mellitus ([facility]) 01/2023       Past Surgical History:   Procedure Laterality Date    BREAST LUMPECTOMY  2015    HYSTERECTOMY Bilateral 09/16/2019    MYOMECTOMY      UMBILICAL HERNIA REPAIR  09/29/2020       Current Outpatient Medications   Medication Sig Dispense Refill    albuterol (PROVENTIL HFA,VENTOLIN HFA) 90 mcg/act inhaler Inhale 2 puffs every 4 (four) hours as needed       amoxicillin (AMOXIL) 875 mg tablet Take 875 mg by mouth 2 (two) times a day      baclofen 10 mg tablet Take 10 mg by mouth Three times daily as needed      buPROPion (WELLBUTRIN XL) 300 mg 24 hr tablet Take 1 tablet by mouth every morning      busPIRone (BUSPAR) 30 MG tablet Take 1 tablet by mouth 2 (two) times a day        COLLAGEN PO Take 24,000 mg by mouth  daily      diclofenac sodium (VOLTAREN) 1 % 3 (three) times a day      DULoxetine (CYMBALTA) 60 mg delayed release capsule Take 1 capsule by mouth 2 (two) times a day      ergocalciferol (VITAMIN D2) 50,000 units 2 (two) times a week      Ferrous Sulfate (IRON) 325 (65 Fe) MG TABS Take 1 tablet by mouth daily      fluticasone (FLONASE) 50 mcg/act nasal spray 2 sprays into each nostril daily as needed       ketoconazole (NIZORAL) 2 % cream as needed      ketoconazole (NIZORAL) 2 % shampoo as needed       LYRICA 300 MG capsule 2 (two) times a day      metFORMIN (GLUCOPHAGE) 500 mg tablet Take 500 mg by mouth in the morning      mirtazapine (REMERON SOL-TAB) 45 mg dispersible tablet Take 45 mg by mouth daily at bedtime      montelukast (SINGULAIR) 10 mg tablet Take 1 tablet by mouth if needed      Multiple Vitamin (MULTIVITAMIN) capsule Take by mouth      Omega-3 Fatty Acids (FISH OIL) 1000 MG CPDR Take by mouth       pantoprazole (PROTONIX) 40 mg tablet Take 40 mg by mouth as needed       traMADol (ULTRAM) 50 mg tablet Take 50 mg by mouth every 8 (eight) hours as needed for moderate pain      Trulicity 0.75 MG/0.5ML injection Inject 0.75 mg under the skin every 7 days      TURMERIC PO Take 1,500 mg by mouth 3 (three) times a day      zolpidem (AMBIEN) 10 mg tablet Take 1 tablet by mouth daily at bedtime as needed      estradiol (VIVELLE-DOT) 0.0375 MG/24HR Place 1 patch on the skin 2 (two) times a week      hydrOXYzine pamoate (VISTARIL) 50 mg capsule daily at bedtime      solifenacin (VESICARE) 5 mg tablet Take 5 mg by mouth daily       No current facility-administered medications for this visit.        Allergies   Allergen Reactions    Aspirin     Shellfish Allergy - Food Allergy     Chlorhexidine Rash    Cinnamon - Food Allergy Rash    Latex Rash       Review of Systems   Constitutional:  Negative for appetite change, fatigue and fever.   HENT: Negative.  Negative for hearing loss, tinnitus, trouble swallowing and  voice change.    Eyes: Negative.  Negative for photophobia, pain and visual disturbance.   Respiratory: Negative.  Negative for shortness of breath.    Cardiovascular: Negative.  Negative for palpitations.   Gastrointestinal: Negative.  Negative for nausea and vomiting.   Endocrine: Negative.  Negative for cold intolerance.   Genitourinary: Negative.  Negative for dysuria, frequency and urgency.   Musculoskeletal:  Negative for back pain, gait problem, myalgias, neck pain and neck stiffness.   Skin: Negative.  Negative for rash.   Allergic/Immunologic: Negative.    Neurological: Negative.  Negative for dizziness, tremors, seizures, syncope, facial asymmetry, speech difficulty, weakness, light-headedness, numbness and headaches.   Hematological: Negative.  Does not bruise/bleed easily.   Psychiatric/Behavioral: Negative.  Negative for confusion, hallucinations and sleep disturbance.        I have reviewed the past medical history, surgical history, social and family history, current medications, allergies vitals, review of systems, and updated this information as appropriate today.  Video Exam    Vitals:    01/16/24 1319   Weight: 119 kg (263 lb 3.2 oz)       Physical Exam   Patient is alert awake oriented x 3.  Speech is fluent naming and repetition is intact.  Extraocular muscles are intact, visual fields are full to confrontation, limited exam secondary to the video visit, no facial asymmetry, hearing is intact bilaterally.  Tongue is midline and moves side-to-side.  Motor examination moves all extremities.  Gait is unremarkable.  Visit Time  Total Visit Duration: 10

## 2024-08-19 ENCOUNTER — TELEPHONE (OUTPATIENT)
Age: 54
End: 2024-08-19

## 2024-08-19 NOTE — TELEPHONE ENCOUNTER
Patient called back to confirm their appt with Dr. Nita Munoz Office 8/21/24 at 1 pm.   Patient verbalized understanding of date, time and location.    OZIEL

## 2024-08-21 ENCOUNTER — OFFICE VISIT (OUTPATIENT)
Dept: NEUROLOGY | Facility: CLINIC | Age: 54
End: 2024-08-21
Payer: COMMERCIAL

## 2024-08-21 VITALS
SYSTOLIC BLOOD PRESSURE: 140 MMHG | BODY MASS INDEX: 37.44 KG/M2 | HEIGHT: 71 IN | WEIGHT: 267.4 LBS | HEART RATE: 77 BPM | DIASTOLIC BLOOD PRESSURE: 90 MMHG

## 2024-08-21 DIAGNOSIS — R42 DIZZINESS AND GIDDINESS: Primary | ICD-10-CM

## 2024-08-21 DIAGNOSIS — R25.1 TREMOR, UNSPECIFIED: ICD-10-CM

## 2024-08-21 PROCEDURE — 99214 OFFICE O/P EST MOD 30 MIN: CPT | Performed by: PSYCHIATRY & NEUROLOGY

## 2024-08-21 NOTE — PROGRESS NOTES
"Tennille Abad is a 54 y.o. female.   Chief Complaint   Patient presents with    Dizziness and giddiness    Memory Loss       Assessment:  1. Dizziness and giddiness    2. Tremor, unspecified        Plan:  Follow-up in 6 months.    Discussion:  Patient's dizziness has resolved and her tremor is mostly when she is on her stress on an anxious and intentional but it is very manageable and is not keen to go on any medication.    Her MoCA is 27/30 and she is under stress and sometimes feel that she has difficulty with attention and concentration, I advised her to continue with mentally stimulating exercises to eat healthy nutritious diet to keep her blood pressure cholesterol sugar and weight under control she is going to a weight loss program to help with her weight.  To go to the hospital if has any worsening symptoms and call me otherwise to see me back in 6 months or sooner if needed and follow-up with the other physicians.    Subjective:    HPI   Patient is here in follow-up for her dizziness and tremor, since her last visit her dizziness has resolved she still continues to have tremor which is mostly intentional and happens when she is anxious or under stress she denies any resting tremor no early morning stiffness no shuffling gait no difficulty in swallowing, sometimes she might have some difficulty in attention and concentration but is able to do her ADLs able to drive without any issues manage her finances her current MoCA is 27/30 she is trying to lose weight and is going to be joining a weight loss program, she has been taken off the Lyrica and is currently only on Cymbalta which is being managed by her family physician and psychiatrist her mood seems to be okay denies any other complaints.    Vitals:    08/21/24 1255   BP: 140/90   BP Location: Right arm   Patient Position: Sitting   Cuff Size: Large   Pulse: 77   Height: 5' 10.5\" (1.791 m)       Current Medications    Current Outpatient Medications:     " albuterol (PROVENTIL HFA,VENTOLIN HFA) 90 mcg/act inhaler, Inhale 2 puffs every 4 (four) hours as needed , Disp: , Rfl:     buPROPion (WELLBUTRIN XL) 300 mg 24 hr tablet, Take 1 tablet by mouth every morning, Disp: , Rfl:     busPIRone (BUSPAR) 30 MG tablet, Take 1 tablet by mouth 2 (two) times a day  , Disp: , Rfl:     COLLAGEN PO, Take 24,000 mg by mouth daily, Disp: , Rfl:     diclofenac sodium (VOLTAREN) 1 %, 3 (three) times a day, Disp: , Rfl:     DULoxetine (CYMBALTA) 60 mg delayed release capsule, Take 1 capsule by mouth 2 (two) times a day, Disp: , Rfl:     ergocalciferol (VITAMIN D2) 50,000 units, 2 (two) times a week, Disp: , Rfl:     Ferrous Sulfate (IRON) 325 (65 Fe) MG TABS, Take 1 tablet by mouth daily, Disp: , Rfl:     fluticasone (FLONASE) 50 mcg/act nasal spray, 2 sprays into each nostril daily as needed , Disp: , Rfl:     metFORMIN (GLUCOPHAGE) 500 mg tablet, Take 500 mg by mouth in the morning, Disp: , Rfl:     mirtazapine (REMERON SOL-TAB) 45 mg dispersible tablet, Take 45 mg by mouth daily at bedtime, Disp: , Rfl:     Multiple Vitamin (MULTIVITAMIN) capsule, Take by mouth, Disp: , Rfl:     Omega-3 Fatty Acids (FISH OIL) 1000 MG CPDR, Take by mouth , Disp: , Rfl:     traMADol (ULTRAM) 50 mg tablet, Take 50 mg by mouth every 8 (eight) hours as needed for moderate pain, Disp: , Rfl:     Trulicity 0.75 MG/0.5ML injection, Inject 0.75 mg under the skin every 7 days, Disp: , Rfl:     TURMERIC PO, Take 1,500 mg by mouth 3 (three) times a day, Disp: , Rfl:     zolpidem (AMBIEN) 10 mg tablet, Take 1 tablet by mouth daily at bedtime as needed, Disp: , Rfl:     amoxicillin (AMOXIL) 875 mg tablet, Take 875 mg by mouth 2 (two) times a day (Patient not taking: Reported on 8/21/2024), Disp: , Rfl:     baclofen 10 mg tablet, Take 10 mg by mouth Three times daily as needed, Disp: , Rfl:     estradiol (VIVELLE-DOT) 0.0375 MG/24HR, Place 1 patch on the skin 2 (two) times a week, Disp: , Rfl:     hydrOXYzine  pamoate (VISTARIL) 50 mg capsule, daily at bedtime, Disp: , Rfl:     ketoconazole (NIZORAL) 2 % cream, as needed, Disp: , Rfl:     ketoconazole (NIZORAL) 2 % shampoo, as needed , Disp: , Rfl:     LYRICA 300 MG capsule, 2 (two) times a day, Disp: , Rfl:     montelukast (SINGULAIR) 10 mg tablet, Take 1 tablet by mouth if needed, Disp: , Rfl:     pantoprazole (PROTONIX) 40 mg tablet, Take 40 mg by mouth as needed , Disp: , Rfl:     solifenacin (VESICARE) 5 mg tablet, Take 5 mg by mouth daily, Disp: , Rfl:       Allergies  Aspirin, Shellfish allergy - food allergy, Chlorhexidine, Cinnamon - food allergy, and Latex    Past Medical History  Past Medical History:   Diagnosis Date    Breast cancer ([facility]) 2014    Eardrum rupture, left     Fibromyalgia, primary     Ovarian CA, unspecified laterality ([facility])     surgery scheduled 06-03-19    Seasonal allergies     Type 2 diabetes mellitus ([facility]) 01/2023         Past Surgical History:  Past Surgical History:   Procedure Laterality Date    BREAST LUMPECTOMY  2015    HYSTERECTOMY Bilateral 09/16/2019    MYOMECTOMY      UMBILICAL HERNIA REPAIR  09/29/2020         Family History:  Family History   Problem Relation Age of Onset    Parkinsonism Mother     Rheum arthritis Mother     Osteoarthritis Mother     Heart attack Mother     Diabetes Mother     Depression Mother     Stroke Mother     Dementia Mother     Hypertension Father     Heart murmur Father     Depression Father     Diabetes Sister     Diabetes Brother     Tremor Maternal Grandmother        Social History:   reports that she has never smoked. She has never used smokeless tobacco. She reports that she does not drink alcohol and does not use drugs.    I have reviewed the past medical history, surgical history, social and family history, current medications, allergies vitals, review of systems, and updated this information as appropriate today.   Objective:    Physical Exam    Neurological Exam     GENERAL:  Cooperative in no  acute distress. Well-developed and well-nourished     HEAD and NECK   Head is atraumatic normocephalic with no lesions or masses. Neck is supple with full range of motion     CARDIOVASCULAR  Carotid Arteries-no carotid bruits.     NEUROLOGIC:  Mental Status-the patient is awake alert and oriented without aphasia or apraxia, MoCA is 27/30  Cranial Nerves: Visual fields are full to confrontation.   Extraocular movements are full without nystagmus. Pupils are 2-1/2 mm and reactive. Face is symmetrical to light touch. Movements of facial expression move symmetrically. Hearing is normal to finger rub bilaterally. Soft palate lifts symmetrically. Shoulder shrug is symmetrical. Tongue is midline without atrophy.  Motor: No drift is noted on arm extension. Strength is full in the upper and lower extremities with normal bulk and tone.  Gait is unremarkable.    ROS:  Review of Systems   Constitutional:  Negative for appetite change, fatigue and fever.   HENT: Negative.  Negative for hearing loss, tinnitus, trouble swallowing and voice change.    Eyes: Negative.  Negative for photophobia, pain and visual disturbance.   Respiratory: Negative.  Negative for shortness of breath.    Cardiovascular: Negative.  Negative for palpitations.   Gastrointestinal: Negative.  Negative for nausea and vomiting.   Endocrine: Negative.  Negative for cold intolerance.   Genitourinary: Negative.  Negative for dysuria, frequency and urgency.   Musculoskeletal:  Negative for back pain, gait problem, myalgias, neck pain and neck stiffness.   Skin: Negative.  Negative for rash.   Allergic/Immunologic: Negative.    Neurological: Negative.  Negative for dizziness, tremors, seizures, syncope, facial asymmetry, speech difficulty, weakness, light-headedness, numbness and headaches.   Hematological: Negative.  Does not bruise/bleed easily.   Psychiatric/Behavioral:  Positive for confusion. Negative for hallucinations and sleep disturbance.

## 2025-03-21 ENCOUNTER — TELEPHONE (OUTPATIENT)
Dept: NEUROLOGY | Facility: CLINIC | Age: 55
End: 2025-03-21

## 2025-08-11 ENCOUNTER — TELEPHONE (OUTPATIENT)
Dept: NEUROLOGY | Facility: CLINIC | Age: 55
End: 2025-08-11

## 2025-08-12 ENCOUNTER — OFFICE VISIT (OUTPATIENT)
Dept: NEUROLOGY | Facility: CLINIC | Age: 55
End: 2025-08-12
Payer: COMMERCIAL